# Patient Record
Sex: FEMALE | Race: WHITE | NOT HISPANIC OR LATINO | URBAN - METROPOLITAN AREA
[De-identification: names, ages, dates, MRNs, and addresses within clinical notes are randomized per-mention and may not be internally consistent; named-entity substitution may affect disease eponyms.]

---

## 2017-04-24 ENCOUNTER — INPATIENT (INPATIENT)
Facility: HOSPITAL | Age: 77
LOS: 3 days | Discharge: HOME | DRG: 242 | End: 2017-04-28
Attending: INTERNAL MEDICINE | Admitting: INTERNAL MEDICINE
Payer: MEDICARE

## 2017-04-24 VITALS
DIASTOLIC BLOOD PRESSURE: 70 MMHG | RESPIRATION RATE: 22 BRPM | OXYGEN SATURATION: 96 % | HEART RATE: 137 BPM | WEIGHT: 83.78 LBS | SYSTOLIC BLOOD PRESSURE: 155 MMHG | TEMPERATURE: 98 F

## 2017-04-24 DIAGNOSIS — I48.92 UNSPECIFIED ATRIAL FLUTTER: ICD-10-CM

## 2017-04-24 LAB
APPEARANCE UR: CLEAR — SIGNIFICANT CHANGE UP
APTT BLD: 34 SEC — SIGNIFICANT CHANGE UP (ref 27.5–37.4)
BASOPHILS # BLD AUTO: 0 K/UL — SIGNIFICANT CHANGE UP (ref 0–0.2)
BASOPHILS NFR BLD AUTO: 0.1 % — SIGNIFICANT CHANGE UP (ref 0–2)
BILIRUB UR-MCNC: NEGATIVE — SIGNIFICANT CHANGE UP
CK MB CFR SERPL CALC: 12.7 NG/ML — HIGH (ref 0–6.7)
CK SERPL-CCNC: 256 U/L — HIGH (ref 25–170)
COLOR SPEC: YELLOW — SIGNIFICANT CHANGE UP
DIFF PNL FLD: ABNORMAL
EPI CELLS # UR: SIGNIFICANT CHANGE UP
GLUCOSE UR QL: NEGATIVE MG/DL — SIGNIFICANT CHANGE UP
HCT VFR BLD CALC: 51 % — HIGH (ref 37–47)
HGB BLD-MCNC: 18.1 G/DL — HIGH (ref 12–16)
INR BLD: 1.07 RATIO — SIGNIFICANT CHANGE UP (ref 0.88–1.16)
KETONES UR-MCNC: ABNORMAL
LACTATE BLDV-MCNC: 3.4 MMOL/L — HIGH (ref 0.5–2)
LEUKOCYTE ESTERASE UR-ACNC: NEGATIVE — SIGNIFICANT CHANGE UP
LYMPHOCYTES # BLD AUTO: 1.2 K/UL — SIGNIFICANT CHANGE UP (ref 1–4.8)
LYMPHOCYTES # BLD AUTO: 6.5 % — LOW (ref 20–55)
MAGNESIUM SERPL-MCNC: 1.9 MG/DL — SIGNIFICANT CHANGE UP (ref 1.8–2.5)
MCHC RBC-ENTMCNC: 31 PG — SIGNIFICANT CHANGE UP (ref 27–31)
MCHC RBC-ENTMCNC: 35.5 G/DL — SIGNIFICANT CHANGE UP (ref 32–36)
MCV RBC AUTO: 87.3 FL — SIGNIFICANT CHANGE UP (ref 81–99)
MONOCYTES # BLD AUTO: 1.2 K/UL — HIGH (ref 0–0.8)
MONOCYTES NFR BLD AUTO: 6.6 % — SIGNIFICANT CHANGE UP (ref 3–10)
NEUTROPHILS # BLD AUTO: 16.1 K/UL — HIGH (ref 1.8–8)
NEUTROPHILS NFR BLD AUTO: 86.5 % — HIGH (ref 37–73)
NITRITE UR-MCNC: NEGATIVE — SIGNIFICANT CHANGE UP
PH UR: 7 — SIGNIFICANT CHANGE UP (ref 5–8)
PLATELET # BLD AUTO: 411 K/UL — HIGH (ref 150–400)
PROT UR-MCNC: 100 MG/DL
PROTHROM AB SERPL-ACNC: 11.8 SEC — SIGNIFICANT CHANGE UP (ref 9.8–12.7)
RBC # BLD: 5.84 M/UL — HIGH (ref 4.4–5.2)
RBC # FLD: 13.8 % — SIGNIFICANT CHANGE UP (ref 11–15.6)
RBC CASTS # UR COMP ASSIST: SIGNIFICANT CHANGE UP /HPF (ref 0–4)
SP GR SPEC: 1.01 — SIGNIFICANT CHANGE UP (ref 1.01–1.02)
TROPONIN T SERPL-MCNC: 0.05 NG/ML — SIGNIFICANT CHANGE UP (ref 0–0.06)
UROBILINOGEN FLD QL: NEGATIVE MG/DL — SIGNIFICANT CHANGE UP
WBC # BLD: 18.5 K/UL — HIGH (ref 4.8–10.8)
WBC # FLD AUTO: 18.5 K/UL — HIGH (ref 4.8–10.8)
WBC UR QL: NEGATIVE — SIGNIFICANT CHANGE UP

## 2017-04-24 PROCEDURE — 99223 1ST HOSP IP/OBS HIGH 75: CPT

## 2017-04-24 PROCEDURE — 70450 CT HEAD/BRAIN W/O DYE: CPT | Mod: 26

## 2017-04-24 PROCEDURE — 71010: CPT | Mod: 26

## 2017-04-24 PROCEDURE — 93010 ELECTROCARDIOGRAM REPORT: CPT

## 2017-04-24 PROCEDURE — 71250 CT THORAX DX C-: CPT | Mod: 26

## 2017-04-24 PROCEDURE — 99285 EMERGENCY DEPT VISIT HI MDM: CPT

## 2017-04-24 RX ORDER — AMITRIPTYLINE HCL 25 MG
25 TABLET ORAL AT BEDTIME
Qty: 0 | Refills: 0 | Status: DISCONTINUED | OUTPATIENT
Start: 2017-04-24 | End: 2017-04-28

## 2017-04-24 RX ORDER — CLOPIDOGREL BISULFATE 75 MG/1
1 TABLET, FILM COATED ORAL
Qty: 0 | Refills: 0 | COMMUNITY

## 2017-04-24 RX ORDER — TIOTROPIUM BROMIDE 18 UG/1
1 CAPSULE ORAL; RESPIRATORY (INHALATION) DAILY
Qty: 0 | Refills: 0 | Status: DISCONTINUED | OUTPATIENT
Start: 2017-04-24 | End: 2017-04-27

## 2017-04-24 RX ORDER — HEPARIN SODIUM 5000 [USP'U]/ML
INJECTION INTRAVENOUS; SUBCUTANEOUS
Qty: 25000 | Refills: 0 | Status: DISCONTINUED | OUTPATIENT
Start: 2017-04-24 | End: 2017-04-25

## 2017-04-24 RX ORDER — HEPARIN SODIUM 5000 [USP'U]/ML
3000 INJECTION INTRAVENOUS; SUBCUTANEOUS EVERY 6 HOURS
Qty: 0 | Refills: 0 | Status: DISCONTINUED | OUTPATIENT
Start: 2017-04-24 | End: 2017-04-25

## 2017-04-24 RX ORDER — AMITRIPTYLINE HCL 25 MG
1 TABLET ORAL
Qty: 0 | Refills: 0 | COMMUNITY

## 2017-04-24 RX ORDER — ONDANSETRON 8 MG/1
4 TABLET, FILM COATED ORAL ONCE
Qty: 0 | Refills: 0 | Status: COMPLETED | OUTPATIENT
Start: 2017-04-24 | End: 2017-04-24

## 2017-04-24 RX ORDER — ALBUTEROL 90 UG/1
2 AEROSOL, METERED ORAL EVERY 6 HOURS
Qty: 0 | Refills: 0 | Status: DISCONTINUED | OUTPATIENT
Start: 2017-04-24 | End: 2017-04-28

## 2017-04-24 RX ORDER — CLOPIDOGREL BISULFATE 75 MG/1
75 TABLET, FILM COATED ORAL DAILY
Qty: 0 | Refills: 0 | Status: DISCONTINUED | OUTPATIENT
Start: 2017-04-24 | End: 2017-04-28

## 2017-04-24 RX ORDER — PANTOPRAZOLE SODIUM 20 MG/1
40 TABLET, DELAYED RELEASE ORAL
Qty: 0 | Refills: 0 | Status: DISCONTINUED | OUTPATIENT
Start: 2017-04-24 | End: 2017-04-28

## 2017-04-24 RX ORDER — OXYCODONE HYDROCHLORIDE 5 MG/1
1 TABLET ORAL
Qty: 0 | Refills: 0 | COMMUNITY

## 2017-04-24 RX ORDER — DILTIAZEM HCL 120 MG
10 CAPSULE, EXT RELEASE 24 HR ORAL
Qty: 125 | Refills: 0 | Status: DISCONTINUED | OUTPATIENT
Start: 2017-04-24 | End: 2017-04-25

## 2017-04-24 RX ORDER — OXYCODONE HYDROCHLORIDE 5 MG/1
10 TABLET ORAL EVERY 12 HOURS
Qty: 0 | Refills: 0 | Status: DISCONTINUED | OUTPATIENT
Start: 2017-04-24 | End: 2017-04-28

## 2017-04-24 RX ORDER — HEPARIN SODIUM 5000 [USP'U]/ML
1500 INJECTION INTRAVENOUS; SUBCUTANEOUS EVERY 6 HOURS
Qty: 0 | Refills: 0 | Status: DISCONTINUED | OUTPATIENT
Start: 2017-04-24 | End: 2017-04-25

## 2017-04-24 RX ORDER — ATORVASTATIN CALCIUM 80 MG/1
40 TABLET, FILM COATED ORAL AT BEDTIME
Qty: 0 | Refills: 0 | Status: DISCONTINUED | OUTPATIENT
Start: 2017-04-24 | End: 2017-04-28

## 2017-04-24 RX ORDER — HEPARIN SODIUM 5000 [USP'U]/ML
3000 INJECTION INTRAVENOUS; SUBCUTANEOUS ONCE
Qty: 0 | Refills: 0 | Status: COMPLETED | OUTPATIENT
Start: 2017-04-24 | End: 2017-04-24

## 2017-04-24 RX ORDER — SODIUM CHLORIDE 9 MG/ML
2000 INJECTION INTRAMUSCULAR; INTRAVENOUS; SUBCUTANEOUS ONCE
Qty: 0 | Refills: 0 | Status: COMPLETED | OUTPATIENT
Start: 2017-04-24 | End: 2017-04-24

## 2017-04-24 RX ORDER — ALPRAZOLAM 0.25 MG
0.25 TABLET ORAL DAILY
Qty: 0 | Refills: 0 | Status: DISCONTINUED | OUTPATIENT
Start: 2017-04-24 | End: 2017-04-25

## 2017-04-24 RX ORDER — ALPRAZOLAM 0.25 MG
0.5 TABLET ORAL DAILY
Qty: 0 | Refills: 0 | Status: DISCONTINUED | OUTPATIENT
Start: 2017-04-24 | End: 2017-04-24

## 2017-04-24 RX ORDER — SODIUM CHLORIDE 9 MG/ML
3 INJECTION INTRAMUSCULAR; INTRAVENOUS; SUBCUTANEOUS ONCE
Qty: 0 | Refills: 0 | Status: COMPLETED | OUTPATIENT
Start: 2017-04-24 | End: 2017-04-24

## 2017-04-24 RX ADMIN — Medication 10 MG/HR: at 17:04

## 2017-04-24 RX ADMIN — Medication 0.25 MILLIGRAM(S): at 23:38

## 2017-04-24 RX ADMIN — HEPARIN SODIUM 700 UNIT(S)/HR: 5000 INJECTION INTRAVENOUS; SUBCUTANEOUS at 18:48

## 2017-04-24 RX ADMIN — ONDANSETRON 4 MILLIGRAM(S): 8 TABLET, FILM COATED ORAL at 16:02

## 2017-04-24 RX ADMIN — HEPARIN SODIUM 3000 UNIT(S): 5000 INJECTION INTRAVENOUS; SUBCUTANEOUS at 18:51

## 2017-04-24 RX ADMIN — SODIUM CHLORIDE 3 MILLILITER(S): 9 INJECTION INTRAMUSCULAR; INTRAVENOUS; SUBCUTANEOUS at 14:19

## 2017-04-24 RX ADMIN — ATORVASTATIN CALCIUM 40 MILLIGRAM(S): 80 TABLET, FILM COATED ORAL at 23:05

## 2017-04-24 RX ADMIN — SODIUM CHLORIDE 2000 MILLILITER(S): 9 INJECTION INTRAMUSCULAR; INTRAVENOUS; SUBCUTANEOUS at 14:19

## 2017-04-24 NOTE — ED PROVIDER NOTE - MEDICAL DECISION MAKING DETAILS
Additional Safety/Bands:
pt with n/v/cp/sob and tachy.  will check labs, CXR, CT, IVF and cards consult.

## 2017-04-24 NOTE — ED ADULT NURSE REASSESSMENT NOTE - NS ED NURSE REASSESS COMMENT FT1
Pt received @1630, A&OX3, denies any pain.  HR at 135.  Pt appears to be sob.  CM in place and maintained.  Dr. Leal at bedside.  Will be begin Cardizem drip soon.  Clear bsb, abd soft nondistended, nontender, moving all ext well.  Will continue to monitor.  at bedside.

## 2017-04-24 NOTE — CONSULT NOTE ADULT - SUBJECTIVE AND OBJECTIVE BOX
Chief Complaint: weakness fatigue rapid pulse.    HPI: Old records reviewed. &^ yo F with 2 day hx of above. Pt stopped her cardizem  and lopressor on Dr. San's order-an op holter showed up to 5 seconds pauses. PMH is signif for O2 dependent copd (quit smoking in 2012) non obstructive cad/right carotid stenting with old cva/left leg stenting and pvd/chronic back pain/fall risk/moderate MR and AI/ap/single kidney/hpt/left subclavuian stenosis. Denies allergy. No etoh. Denies mi/dm/siezure/hepatic or thyroid hx. Very inactive at home. On chronic opoid therapy for pain.    PAST MEDICAL & SURGICAL HISTORY:  Dyslipidemia  RBBB  Respiratory failure  PVD (peripheral vascular disease)  HTN (hypertension)  Bradycardia  Renal artery anomaly  Vascular disease  CAD (coronary artery disease)  Atrial septal aneurysm  Cardiomyopathy  Hyperlipemia  Hypertension  CVA (cerebral infarction)  COPD (chronic obstructive pulmonary disease)  Atrial fibrillation  Cardiac arrest  Pulmonary edema  S/P appendectomy  S/P D&amp;C (status post dilation and curettage)      PREVIOUS DIAGNOSTIC TESTING:      ECHO  FINDINGS: EF 70% biatrial enlargement/mod MR and AI.    STRESS  FINDINGS:    CATHETERIZATION  FINDINGS: nonobstructive cad.    MEDICATIONS  (STANDING):  ondansetron Injectable 4milliGRAM(s) IV Push once    MEDICATIONS  (PRN):      FAMILY HISTORY:  No pertinent family history in first degree relatives      SOCIAL HISTORY: lives w     CIGARETTES: see above    ALCOHOL: 0    ROS: Negative other than as mentioned in HPI.    Vital Signs Last 24 Hrs  T(C): 36.7, Max: 36.7 (04-24 @ 13:41)  T(F): 98, Max: 98 (04-24 @ 13:41)  HR: 137 (137 - 137)  BP: 140/80 ra and 120/80 la manually.  BP(mean): --  RR: 18 (22 - 22)  SpO2: 96% (96% - 96%)    PHYSICAL EXAM:  General: Elderly frail F nad.  HEENT: Head; normocephalic, atraumatic.  Eyes;   Pupils reactive, cornea wnl.  Neck; Supple, no nodes adenopathy, no NVD. Soft left carotid bruitr. No thyromegaly.  CARDIOVASCULAR: Irreg tach approx 150-monitor shoiws aflutter.  LUNGS; No rales, rhonchi or wheeze. Normal breath sounds bilaterally.  ABDOMEN ; Soft, nontender without mass or organomegaly. bowel sounds normoactive.  EXTREMITIES; No clubbing, cyanosis or edema. absent pedal pulses.  SKIN; warm and dry with normal turgor.  NEURO; Alert/oriented x 3/normal motor exam. No pathologic reflexes.    PSYCH; normal affect.            INTERPRETATION OF TELEMETRY:    ECG: rapid atrial flutter/rbbbb  CXR: increased markings bilat/ca aortic knob/left hilar mass not noted on last yrs cxr.  I&O's Detail      LABS:                        18.1   18.5  )-----------( 411      ( 24 Apr 2017 14:22 )             51.0     04-24    137  |  96<L>  |  30.0<H>  ----------------------------<  164<H>  4.0   |  17.0<L>  |  1.13    Ca    7.6<L>      24 Apr 2017 14:22    TPro  8.6  /  Alb  4.7  /  TBili  0.7  /  DBili  x   /  AST  33<H>  /  ALT  13  /  AlkPhos  69  04-24    CARDIAC MARKERS ( 24 Apr 2017 14:22 )  x     / 0.05 ng/mL / 256 U/L / x     / 12.7 ng/mL  blood gas lactate 3.4    PT/INR - ( 24 Apr 2017 14:22 )   PT: 11.8 sec;   INR: 1.07 ratio         PTT - ( 24 Apr 2017 14:22 )  PTT:34.0 sec    I&O's Summary      RADIOLOGY & ADDITIONAL STUDIES: Chief Complaint: weakness fatigue rapid pulse.    HPI: Old records reviewed. &^ yo F with 2 day hx of above. Pt stopped her cardizem  and lopressor on Dr. San's order-an op holter showed up to 5 seconds pauses. PMH is signif for O2 dependent copd (quit smoking in 2012) non obstructive cad/right carotid stenting with old cva/left leg stenting and pvd/chronic back pain/fall risk/moderate MR and AI/ap/single kidney/hpt/left subclavuian stenosis. Denies allergy. No etoh. Denies mi/dm/siezure/hepatic or thyroid hx. Very inactive at home. On chronic opoid therapy for pain. OP meds listed are cardizem 180 and lopressor 12.5 bid both held yesterday/albuterol/xanax/lipitor 40/kcl 20/protonix/prolia/tudorza inhaler/fe.    PAST MEDICAL & SURGICAL HISTORY:  Dyslipidemia  RBBB  Respiratory failure  PVD (peripheral vascular disease)  HTN (hypertension)  Bradycardia  Renal artery anomaly  Vascular disease  CAD (coronary artery disease)  Atrial septal aneurysm  Cardiomyopathy  Hyperlipemia  Hypertension  CVA (cerebral infarction)  COPD (chronic obstructive pulmonary disease)  Atrial fibrillation  Cardiac arrest  Pulmonary edema  S/P appendectomy  S/P D&amp;C (status post dilation and curettage)      PREVIOUS DIAGNOSTIC TESTING:      ECHO  FINDINGS: EF 70% biatrial enlargement/mod MR and AI.    STRESS  FINDINGS:    CATHETERIZATION  FINDINGS: nonobstructive cad.    MEDICATIONS  (STANDING):  ondansetron Injectable 4milliGRAM(s) IV Push once    MEDICATIONS  (PRN):      FAMILY HISTORY:  No pertinent family history in first degree relatives      SOCIAL HISTORY: lives w     CIGARETTES: see above    ALCOHOL: 0    ROS: Negative other than as mentioned in HPI.    Vital Signs Last 24 Hrs  T(C): 36.7, Max: 36.7 (04-24 @ 13:41)  T(F): 98, Max: 98 (04-24 @ 13:41)  HR: 137 (137 - 137)  BP: 140/80 ra and 120/80 la manually.  BP(mean): --  RR: 18 (22 - 22)  SpO2: 96% (96% - 96%)    PHYSICAL EXAM:  General: Elderly frail F nad.  HEENT: Head; normocephalic, atraumatic.  Eyes;   Pupils reactive, cornea wnl.  Neck; Supple, no nodes adenopathy, no NVD. Soft left carotid bruitr. No thyromegaly.  CARDIOVASCULAR: Irreg tach approx 150-monitor shoiws aflutter.  LUNGS; No rales, rhonchi or wheeze. Normal breath sounds bilaterally.  ABDOMEN ; Soft, nontender without mass or organomegaly. bowel sounds normoactive.  EXTREMITIES; No clubbing, cyanosis or edema. absent pedal pulses.  SKIN; warm and dry with normal turgor.  NEURO; Alert/oriented x 3/normal motor exam. No pathologic reflexes.    PSYCH; normal affect.            INTERPRETATION OF TELEMETRY:    ECG: rapid atrial flutter/rbbbb  CXR: increased markings bilat/ca aortic knob/left hilar mass not noted on last yrs cxr.  I&O's Detail      LABS:                        18.1   18.5  )-----------( 411      ( 24 Apr 2017 14:22 )             51.0     04-24    137  |  96<L>  |  30.0<H>  ----------------------------<  164<H>  4.0   |  17.0<L>  |  1.13    Ca    7.6<L>      24 Apr 2017 14:22    TPro  8.6  /  Alb  4.7  /  TBili  0.7  /  DBili  x   /  AST  33<H>  /  ALT  13  /  AlkPhos  69  04-24    CARDIAC MARKERS ( 24 Apr 2017 14:22 )  x     / 0.05 ng/mL / 256 U/L / x     / 12.7 ng/mL  blood gas lactate 3.4    PT/INR - ( 24 Apr 2017 14:22 )   PT: 11.8 sec;   INR: 1.07 ratio         PTT - ( 24 Apr 2017 14:22 )  PTT:34.0 sec    I&O's Summary      RADIOLOGY & ADDITIONAL STUDIES:

## 2017-04-24 NOTE — ED PROVIDER NOTE - OBJECTIVE STATEMENT
77 y/o female in ED c/o weakness, cp, n/v x 1 wk s/p stopping BP meds.  pt states seen by Dr San (cards) last week and had normal echo and was found with bradycardia on Holter.  pt states her cardiazem and lopressor was stopped by her doctor and since then not feeling well.  pt denies any fever, HA, diarrhea.  tolerating some fluids.  no sick contacts or recent travel.

## 2017-04-24 NOTE — ED ADULT NURSE NOTE - OBJECTIVE STATEMENT
PT states she wasn't feeling well and saw cardiology Mena and wore a halter for 24hrs, pt states Dr San's office called her and told her to stop taking Metopolol and to go to ED. Pt states she hasn't been feeling well she has been nauseous and vomiting. Pt is A & ox3, respirations are even & unlabored. Pt's HR is fluctuating between 130 - 142BPM.  MD Lo aware and at bedside.

## 2017-04-24 NOTE — CONSULT NOTE ADULT - SUBJECTIVE AND OBJECTIVE BOX
[facility]                                   [address] [place] [address]                                                      PHONE: [phone]                                                         FAX: [phone]  [email or web address]  ---------------------------------------------------------------------------------------------------------------------------------    Reason for Consult:    HPI:  TYLER LANGSTON is an 76y Female with history PVD, carotid and LE stents, old CVA, O2 dependent COPD, history of documented sick sinus syndrome and sinus pauses to 5 secondswhich led to discontinuation of Cardizem and lopressor several days ago (sees Dr. San in our office), moderate MR and AR by echo, single kidnet, left subclavian stenoisis, admitted with several days fatigue, malaise, rapid heart beating, lightheadedness.  Noted atrial flutter with predominant 2:1 AV block, tachycardia here.    PAST MEDICAL & SURGICAL HISTORY:  Dyslipidemia  RBBB  Respiratory failure  PVD (peripheral vascular disease)  HTN (hypertension)  Bradycardia  Renal artery anomaly  Vascular disease  CAD (coronary artery disease)  Atrial septal aneurysm  Cardiomyopathy  Hyperlipemia  Hypertension  CVA (cerebral infarction)  COPD (chronic obstructive pulmonary disease)  Atrial fibrillation  Cardiac arrest  Pulmonary edema  S/P appendectomy  S/P D&amp;C (status post dilation and curettage)      fiberglass (Rash)  No Known Drug Allergies      MEDICATIONS  (STANDING):  diltiazem Infusion 10mG/Hr IV Continuous <Continuous>    MEDICATIONS  (PRN):      Social History:  Cigarettes:                    Alchohol:                 Illicit Drug Abuse:      ROS: Negative other than as mentioned in HPI.    Vital Signs Last 24 Hrs  T(C): 37.7, Max: 37.7 (04-24 @ 15:30)  T(F): 99.9, Max: 99.9 (04-24 @ 15:30)  HR: 128 (128 - 137)  BP: 150/83 (150/83 - 155/70)  BP(mean): --  RR: 19 (19 - 22)  SpO2: 98% (96% - 98%)  ICU Vital Signs Last 24 Hrs  TYLERDIAMOND MILLERIVIS  I&O's Detail    I&O's Summary    Drug Dosing Weight  TYLER MILLERETON      PHYSICAL EXAM:  General: Appears well developed, well nourished alert and cooperative.  HEENT: Head; normocephalic, atraumatic.  Eyes: Pupils reactive, cornea wnl.  Neck: Supple, no nodes adenopathy, no NVD or carotid bruit or thyromegaly.  CARDIOVASCULAR: tachy, regular rhythm,  S1 and S2, 2/6 HSM apex.   LUNGS: No rales, rhonchi or wheeze. Normal breath sounds bilaterally.  ABDOMEN: Soft, nontender without mass or organomegaly. bowel sounds normoactive.  EXTREMITIES: No clubbing, cyanosis or edema. Distal pulses wnl.   SKIN: warm and dry with normal turgor.  NEURO: Alert/oriented x 3/normal motor exam. No pathologic reflexes.    PSYCH: normal affect.        LABS:                        18.1   18.5  )-----------( 411      ( 24 Apr 2017 14:22 )             51.0     04-24    137  |  96<L>  |  30.0<H>  ----------------------------<  164<H>  4.0   |  17.0<L>  |  1.13    Ca    7.6<L>      24 Apr 2017 14:22    TPro  8.6  /  Alb  4.7  /  TBili  0.7  /  DBili  x   /  AST  33<H>  /  ALT  13  /  AlkPhos  69  04-24    TYLER LANGSTON  CARDIAC MARKERS ( 24 Apr 2017 14:22 )  x     / 0.05 ng/mL / 256 U/L / x     / 12.7 ng/mL      PT/INR - ( 24 Apr 2017 14:22 )   PT: 11.8 sec;   INR: 1.07 ratio         PTT - ( 24 Apr 2017 14:22 )  PTT:34.0 sec      RADIOLOGY & ADDITIONAL STUDIES:    INTERPRETATION OF TELEMETRY (personally reviewed): atrial flutter, tachy    ECG: a flutter 2:1 av block    ECHO:4/2017- hyperdynamic LV, mod MR, AR, RVSP ~30        Assessment and Plan:  In summary, TYLER LANGSTON is an 76y Female with past medical history significant for SSS, HTN, COPD, admitted with symptomatic rapidly conducted atrial flutter.  Will need pacemaker to allow for safe reintroduction of AV jacinto blocking agents given recent alexander.  Discussed with patient, will plan for pacer tomorrow.  Keep NPO after midnight. Low dose IV cardizem for  IV heparin for CVA prophylaxis for now, transition to oral anticoagulation after pacer.

## 2017-04-24 NOTE — ED PROVIDER NOTE - PMH
Atrial fibrillation    Atrial septal aneurysm    Bradycardia    CAD (coronary artery disease)    Cardiac arrest    Cardiomyopathy    COPD (chronic obstructive pulmonary disease)    CVA (cerebral infarction)    Dyslipidemia    HTN (hypertension)    Hyperlipemia    Hypertension    Pulmonary edema    PVD (peripheral vascular disease)    RBBB    Renal artery anomaly    Respiratory failure    Vascular disease

## 2017-04-24 NOTE — H&P ADULT - HISTORY OF PRESENT ILLNESS
76 y F hx CAD, COPD on home O2 qhs, CVA, chronic back pain, afib, PAD s/p stents, presented to ER c/o 2 d hx n/v, palpitations, chest discomfort. Diltiazem and metoprolol stopped 2 d ago by Dr Miranda for sinus pauses noted on recent holter monitoring. Denies F/C, cough, SOB, diarrhea, abd pain, h/a, LE edema. noted to be in aflutter in ED w HR 120s-180s, evaluated by cardiology and started on cardizem drip. Feeling a bit better now.

## 2017-04-24 NOTE — H&P ADULT - ASSESSMENT
76 y F hx CAD, COPD, CVA, PAD, chronic back pain, anxiety, HTN, mod-severe MR, adm for n/v, palpitations, SSS.     Tachy/alexander syndrome: appreciate cardiology/EP input, for PPM tomorrow. IVF given in ED. started on cardizem drip for rate control. IV heparin for AC. monitor lytes.   CAD: continue home meds as tolerated  Chronic back pain: cont home pain meds  HTN: on cardizem, resume home meds as tolerated    prophyl: anticoagulated

## 2017-04-24 NOTE — H&P ADULT - NSHPLABSRESULTS_GEN_ALL_CORE
CXR: Impression:  Cardiomegaly. Clear lungs..    EKG: aflutter, IRBBB, nonspecific ST/T wave abnormalities

## 2017-04-24 NOTE — CONSULT NOTE ADULT - ASSESSMENT
1. SSS/tachybrady syndrome with current rapid atrial flutter with a VR in 150's Off meds. Very recent holter showed 5 sec pauses. Pt will need a permanent pacer and EP has been called. In the interim will lower VR with IV cardizem.  2. copd-O2 dependent-former smker.  3. hx of hpertension w left subclavian stenosis  4. carotid stenting on left with old cva.  5. pvd-prior stenting.  6. portable cxr suggests left hilar mass-would get ct for further delineation  7. chronic back pain  8. secondary polycythemia.

## 2017-04-24 NOTE — H&P ADULT - NSHPPHYSICALEXAM_GEN_ALL_CORE
Vital Signs Last 24 Hrs  T(C): 37.7, Max: 37.7 (04-24 @ 15:30)  T(F): 99.9, Max: 99.9 (04-24 @ 15:30)  HR: 128 (128 - 137)  BP: 150/83 (150/83 - 155/70)  BP(mean): --  RR: 19 (19 - 22)  SpO2: 98% (96% - 98%)    PHYSICAL EXAM-  GENERAL: alert, NAD  HEAD:  Atraumatic, Normocephalic  EYES: EOMI,  conjunctiva and sclera clear  NECK: Supple   CHEST/LUNG: CTA bilaterally   HEART: regular, tachycardic,  No murmurs   ABDOMEN: Soft, Nontender, Nondistended; Bowel sounds present  EXTREMITIES:  2+ Peripheral Pulses, No clubbing, cyanosis, or edema  NERVOUS SYSTEM:  Alert & Oriented X3, Motor Strength 5/5 B/L upper and lower extremities; CN grossly intact  SKIN: No rashes or lesions  PSYCH: normal affect

## 2017-04-24 NOTE — ED PROVIDER NOTE - PROGRESS NOTE DETAILS
pt evaluated by Dr Leal and states pt in intermittent AFl.  recommend cardiazem drip and admit for PM. renata d/w Jose and will admit

## 2017-04-25 DIAGNOSIS — I49.5 SICK SINUS SYNDROME: ICD-10-CM

## 2017-04-25 DIAGNOSIS — I25.10 ATHEROSCLEROTIC HEART DISEASE OF NATIVE CORONARY ARTERY WITHOUT ANGINA PECTORIS: ICD-10-CM

## 2017-04-25 DIAGNOSIS — J44.9 CHRONIC OBSTRUCTIVE PULMONARY DISEASE, UNSPECIFIED: ICD-10-CM

## 2017-04-25 DIAGNOSIS — E78.5 HYPERLIPIDEMIA, UNSPECIFIED: ICD-10-CM

## 2017-04-25 DIAGNOSIS — I10 ESSENTIAL (PRIMARY) HYPERTENSION: ICD-10-CM

## 2017-04-25 LAB
ANION GAP SERPL CALC-SCNC: 19 MMOL/L — HIGH (ref 5–17)
APTT BLD: 65.7 SEC — HIGH (ref 27.5–37.4)
APTT BLD: 99.3 SEC — HIGH (ref 27.5–37.4)
BUN SERPL-MCNC: 16 MG/DL — SIGNIFICANT CHANGE UP (ref 8–20)
CALCIUM SERPL-MCNC: 8.2 MG/DL — LOW (ref 8.6–10.2)
CHLORIDE SERPL-SCNC: 99 MMOL/L — SIGNIFICANT CHANGE UP (ref 98–107)
CO2 SERPL-SCNC: 17 MMOL/L — LOW (ref 22–29)
CREAT SERPL-MCNC: 0.58 MG/DL — SIGNIFICANT CHANGE UP (ref 0.5–1.3)
GLUCOSE SERPL-MCNC: 124 MG/DL — HIGH (ref 70–115)
HCT VFR BLD CALC: 42.5 % — SIGNIFICANT CHANGE UP (ref 37–47)
HCT VFR BLD CALC: 43 % — SIGNIFICANT CHANGE UP (ref 37–47)
HGB BLD-MCNC: 14.6 G/DL — SIGNIFICANT CHANGE UP (ref 12–16)
HGB BLD-MCNC: 15 G/DL — SIGNIFICANT CHANGE UP (ref 12–16)
MCHC RBC-ENTMCNC: 30.3 PG — SIGNIFICANT CHANGE UP (ref 27–31)
MCHC RBC-ENTMCNC: 30.4 PG — SIGNIFICANT CHANGE UP (ref 27–31)
MCHC RBC-ENTMCNC: 34.4 G/DL — SIGNIFICANT CHANGE UP (ref 32–36)
MCHC RBC-ENTMCNC: 34.9 G/DL — SIGNIFICANT CHANGE UP (ref 32–36)
MCV RBC AUTO: 86.9 FL — SIGNIFICANT CHANGE UP (ref 81–99)
MCV RBC AUTO: 88.5 FL — SIGNIFICANT CHANGE UP (ref 81–99)
PLATELET # BLD AUTO: 346 K/UL — SIGNIFICANT CHANGE UP (ref 150–400)
PLATELET # BLD AUTO: 351 K/UL — SIGNIFICANT CHANGE UP (ref 150–400)
POTASSIUM SERPL-MCNC: 3.2 MMOL/L — LOW (ref 3.5–5.3)
POTASSIUM SERPL-SCNC: 3.2 MMOL/L — LOW (ref 3.5–5.3)
RBC # BLD: 4.8 M/UL — SIGNIFICANT CHANGE UP (ref 4.4–5.2)
RBC # BLD: 4.95 M/UL — SIGNIFICANT CHANGE UP (ref 4.4–5.2)
RBC # FLD: 13.9 % — SIGNIFICANT CHANGE UP (ref 11–15.6)
RBC # FLD: 14.1 % — SIGNIFICANT CHANGE UP (ref 11–15.6)
SODIUM SERPL-SCNC: 135 MMOL/L — SIGNIFICANT CHANGE UP (ref 135–145)
TROPONIN T SERPL-MCNC: 0.04 NG/ML — SIGNIFICANT CHANGE UP (ref 0–0.06)
WBC # BLD: 13.4 K/UL — HIGH (ref 4.8–10.8)
WBC # BLD: 15.9 K/UL — HIGH (ref 4.8–10.8)
WBC # FLD AUTO: 13.4 K/UL — HIGH (ref 4.8–10.8)
WBC # FLD AUTO: 15.9 K/UL — HIGH (ref 4.8–10.8)

## 2017-04-25 PROCEDURE — 93010 ELECTROCARDIOGRAM REPORT: CPT

## 2017-04-25 PROCEDURE — 99233 SBSQ HOSP IP/OBS HIGH 50: CPT

## 2017-04-25 RX ORDER — ONDANSETRON 8 MG/1
4 TABLET, FILM COATED ORAL EVERY 8 HOURS
Qty: 0 | Refills: 0 | Status: DISCONTINUED | OUTPATIENT
Start: 2017-04-25 | End: 2017-04-28

## 2017-04-25 RX ORDER — CEFAZOLIN SODIUM 1 G
2000 VIAL (EA) INJECTION EVERY 8 HOURS
Qty: 0 | Refills: 0 | Status: COMPLETED | OUTPATIENT
Start: 2017-04-26 | End: 2017-04-26

## 2017-04-25 RX ORDER — CEPHALEXIN 500 MG
500 CAPSULE ORAL
Qty: 0 | Refills: 0 | Status: DISCONTINUED | OUTPATIENT
Start: 2017-04-26 | End: 2017-04-28

## 2017-04-25 RX ORDER — ACETAMINOPHEN 500 MG
650 TABLET ORAL EVERY 6 HOURS
Qty: 0 | Refills: 0 | Status: DISCONTINUED | OUTPATIENT
Start: 2017-04-25 | End: 2017-04-28

## 2017-04-25 RX ORDER — DILTIAZEM HCL 120 MG
120 CAPSULE, EXT RELEASE 24 HR ORAL DAILY
Qty: 0 | Refills: 0 | Status: DISCONTINUED | OUTPATIENT
Start: 2017-04-25 | End: 2017-04-26

## 2017-04-25 RX ORDER — ALPRAZOLAM 0.25 MG
0.25 TABLET ORAL THREE TIMES A DAY
Qty: 0 | Refills: 0 | Status: DISCONTINUED | OUTPATIENT
Start: 2017-04-25 | End: 2017-04-28

## 2017-04-25 RX ORDER — POTASSIUM CHLORIDE 20 MEQ
10 PACKET (EA) ORAL
Qty: 0 | Refills: 0 | Status: COMPLETED | OUTPATIENT
Start: 2017-04-25 | End: 2017-04-25

## 2017-04-25 RX ADMIN — CLOPIDOGREL BISULFATE 75 MILLIGRAM(S): 75 TABLET, FILM COATED ORAL at 12:15

## 2017-04-25 RX ADMIN — Medication 25 MILLIGRAM(S): at 21:29

## 2017-04-25 RX ADMIN — Medication 10 MG/HR: at 05:33

## 2017-04-25 RX ADMIN — Medication 0.25 MILLIGRAM(S): at 21:29

## 2017-04-25 RX ADMIN — ATORVASTATIN CALCIUM 40 MILLIGRAM(S): 80 TABLET, FILM COATED ORAL at 23:00

## 2017-04-25 RX ADMIN — Medication 0.25 MILLIGRAM(S): at 12:15

## 2017-04-25 RX ADMIN — HEPARIN SODIUM 700 UNIT(S)/HR: 5000 INJECTION INTRAVENOUS; SUBCUTANEOUS at 08:43

## 2017-04-25 RX ADMIN — ONDANSETRON 4 MILLIGRAM(S): 8 TABLET, FILM COATED ORAL at 22:06

## 2017-04-25 RX ADMIN — Medication 100 MILLIEQUIVALENT(S): at 21:29

## 2017-04-25 RX ADMIN — OXYCODONE HYDROCHLORIDE 10 MILLIGRAM(S): 5 TABLET ORAL at 06:02

## 2017-04-25 RX ADMIN — HEPARIN SODIUM 700 UNIT(S)/HR: 5000 INJECTION INTRAVENOUS; SUBCUTANEOUS at 02:00

## 2017-04-25 RX ADMIN — PANTOPRAZOLE SODIUM 40 MILLIGRAM(S): 20 TABLET, DELAYED RELEASE ORAL at 06:02

## 2017-04-25 RX ADMIN — Medication 120 MILLIGRAM(S): at 21:29

## 2017-04-25 RX ADMIN — OXYCODONE HYDROCHLORIDE 10 MILLIGRAM(S): 5 TABLET ORAL at 06:44

## 2017-04-25 RX ADMIN — Medication 100 MILLIEQUIVALENT(S): at 17:56

## 2017-04-25 RX ADMIN — Medication 100 MILLIEQUIVALENT(S): at 23:05

## 2017-04-25 NOTE — DIETITIAN INITIAL EVALUATION ADULT. - OTHER INFO
Pt admitted with YOLY RomanO for PPM this date.  reports pt has hx of poor appetite pta and weight fluctuates between # for several years.  reports many tests have been done over the years, only probably cause of decreased appetite is continuous pain medication use for chronic back pain.  Pt drinks Glucerna at home (due to preference), only when she skips a meal. Education provided on high calorie/protein diet.

## 2017-04-25 NOTE — PROGRESS NOTE ADULT - ASSESSMENT
76 y F hx CAD, COPD, CVA, PAD, chronic back pain, anxiety, HTN, mod-severe MR here with atrial flutter. Awaiting

## 2017-04-25 NOTE — CHART NOTE - NSCHARTNOTEFT_GEN_A_CORE
Upon Nutritional Assessment by the Registered Dietitian your patient was determined to meet criteria / has evidence of the following diagnosis/diagnoses:          [ ]  Mild Protein Calorie Malnutrition        [ ]  Moderate Protein Calorie Malnutrition        [ ] Severe Protein Calorie Malnutrition        [ ] Unspecified Protein Calorie Malnutrition        [ ] Underweight / BMI <19        [ ] Morbid Obesity / BMI > 40      Findings as based on:  •  Comprehensive nutrition assessment and consultation  •  Calorie counts (nutrient intake analysis)  •  Food acceptance and intake status from observations by staff  •  Follow up  •  Patient education  •  Intervention secondary to interdisciplinary rounds  •   concerns      Treatment:    The following diet has been recommended:  -- restart diet as medically feasible: Regular with Ensure 1 can BID       PROVIDER Section:     By signing this assessment you are acknowledging and agree with the diagnosis/diagnoses assigned by the Registered Dietitian    Comments:

## 2017-04-25 NOTE — DISCHARGE NOTE ADULT - PLAN OF CARE
NSR s/p pacemaker implant Post Operative Pacemaker Device Instructions  -Please schedule a 7-10 day follow up with [facility]  - Bruising around the implant site or over the chest, side or arm near the incision is normal, and will take a few weeks to resolve.  -Do not lift the affected arm higher than 90 degrees (shoulder height) in any direction for 6 weeks.   - Do not push, pull or lift anything heavier than 10 lbs (about a gallon of milk) with the affected arm for 4 weeks.     -keep site dry for one week and dressing on until follow up with [facility]  - Do not touch the incision until it is completely healed.   - There are Steristrips (white strips of tape) on your incision, which will start to peel off on their own over the next 2-3 weeks. Do not pick at or peel off the Steristrips.   - Do not apply soaps, creams, lotions, ointments or powders to the incision until it is completely healed.  You should call the doctor if:   - you notice redness, drainage, swelling, increased tenderness, hot sensation around the  incision, bleeding or incision edges pulling apart.  - your temperature is greater than 100 degress F for more than 24 hours.  - you notice swelling or bulging at the incision or around the device that was not there when you left the hospital or is increasing in size.  -you experience increased difficulty breathing.  - you notice new/worsening swelling in your legs and ankles.  - you faint or have dizzy spells.  -you have any questions or concerns regarding your device or the procedure.

## 2017-04-25 NOTE — DISCHARGE NOTE ADULT - PATIENT PORTAL LINK FT
“You can access the [employer] Patient Portal, offered by [facility], by registering with the following website: [email or web address]”

## 2017-04-25 NOTE — DISCHARGE NOTE ADULT - CARE PLAN
Principal Discharge DX:	Tachy-alexander syndrome  Goal:	NSR s/p pacemaker implant  Instructions for follow-up, activity and diet:	Post Operative Pacemaker Device Instructions  -Please schedule a 7-10 day follow up with [facility] Device clinic  - Bruising around the implant site or over the chest, side or arm near the incision is normal, and will take a few weeks to resolve.  -Do not lift the affected arm higher than 90 degrees (shoulder height) in any direction for 6 weeks.   - Do not push, pull or lift anything heavier than 10 lbs (about a gallon of milk) with the affected arm for 4 weeks.     -keep site dry for one week and dressing on until follow up with [facility]  - Do not touch the incision until it is completely healed.   - There are Steristrips (white strips of tape) on your incision, which will start to peel off on their own over the next 2-3 weeks. Do not pick at or peel off the Steristrips.   - Do not apply soaps, creams, lotions, ointments or powders to the incision until it is completely healed.  You should call the doctor if:   - you notice redness, drainage, swelling, increased tenderness, hot sensation around the  incision, bleeding or incision edges pulling apart.  - your temperature is greater than 100 degress F for more than 24 hours.  - you notice swelling or bulging at the incision or around the device that was not there when you left the hospital or is increasing in size.  -you experience increased difficulty breathing.  - you notice new/worsening swelling in your legs and ankles.  - you faint or have dizzy spells.  -you have any questions or concerns regarding your device or the procedure. Principal Discharge DX:	Tachy-alexander syndrome  Goal:	NSR s/p pacemaker implant  Instructions for follow-up, activity and diet:	Post Operative Pacemaker Device Instructions  -Please schedule a 7-10 day follow up with [facility] Device clinic  - Bruising around the implant site or over the chest, side or arm near the incision is normal, and will take a few weeks to resolve.  -Do not lift the affected arm higher than 90 degrees (shoulder height) in any direction for 6 weeks.   - Do not push, pull or lift anything heavier than 10 lbs (about a gallon of milk) with the affected arm for 4 weeks.     -keep site dry for one week and dressing on until follow up with [facility]  - Do not touch the incision until it is completely healed.   - There are Steristrips (white strips of tape) on your incision, which will start to peel off on their own over the next 2-3 weeks. Do not pick at or peel off the Steristrips.   - Do not apply soaps, creams, lotions, ointments or powders to the incision until it is completely healed.  You should call the doctor if:   - you notice redness, drainage, swelling, increased tenderness, hot sensation around the  incision, bleeding or incision edges pulling apart.  - your temperature is greater than 100 degress F for more than 24 hours.  - you notice swelling or bulging at the incision or around the device that was not there when you left the hospital or is increasing in size.  -you experience increased difficulty breathing.  - you notice new/worsening swelling in your legs and ankles.  - you faint or have dizzy spells.  -you have any questions or concerns regarding your device or the procedure. Principal Discharge DX:	Tachy-alexander syndrome  Goal:	NSR s/p pacemaker implant  Instructions for follow-up, activity and diet:	Post Operative Pacemaker Device Instructions  -Please schedule a 7-10 day follow up with [facility] Device clinic  - Bruising around the implant site or over the chest, side or arm near the incision is normal, and will take a few weeks to resolve.  -Do not lift the affected arm higher than 90 degrees (shoulder height) in any direction for 6 weeks.   - Do not push, pull or lift anything heavier than 10 lbs (about a gallon of milk) with the affected arm for 4 weeks.     -keep site dry for one week and dressing on until follow up with [facility]  - Do not touch the incision until it is completely healed.   - There are Steristrips (white strips of tape) on your incision, which will start to peel off on their own over the next 2-3 weeks. Do not pick at or peel off the Steristrips.   - Do not apply soaps, creams, lotions, ointments or powders to the incision until it is completely healed.  You should call the doctor if:   - you notice redness, drainage, swelling, increased tenderness, hot sensation around the  incision, bleeding or incision edges pulling apart.  - your temperature is greater than 100 degress F for more than 24 hours.  - you notice swelling or bulging at the incision or around the device that was not there when you left the hospital or is increasing in size.  -you experience increased difficulty breathing.  - you notice new/worsening swelling in your legs and ankles.  - you faint or have dizzy spells.  -you have any questions or concerns regarding your device or the procedure.

## 2017-04-25 NOTE — DISCHARGE NOTE ADULT - MEDICATION SUMMARY - MEDICATIONS TO STOP TAKING
I will STOP taking the medications listed below when I get home from the hospital:    Lasix 40 mg oral tablet  -- 1 tab(s) by mouth once a day    metoprolol  --   12.5mg 2 times daily    potassium chloride 20 mEq/15 mL oral liquid  -- 15 milliliter(s) by mouth once a day    acetaminophen-hydrocodone  --  by mouth , As Needed    hydrALAZINE 10 mg oral tablet  --  by mouth 2 times a day

## 2017-04-25 NOTE — DISCHARGE NOTE ADULT - MEDICATION SUMMARY - MEDICATIONS TO TAKE
I will START or STAY ON the medications listed below when I get home from the hospital:    acetaminophen 325 mg oral tablet  -- 2 tab(s) by mouth every 6 hours, As needed, Mild Pain  -- Indication: For Pain    OxyCONTIN 10 mg oral tablet, extended release  -- 1 tab(s) by mouth every 12 hours  -- Indication: For Pain    dilTIAZem 300 mg/24 hours oral capsule, extended release  -- 1 cap(s) by mouth once a day  -- Indication: For heart rate    amitriptyline 25 mg oral tablet  -- 1 tab(s) by mouth once a day (at bedtime)  -- Indication: For Pain    atorvastatin 40 mg oral tablet  -- 1 tab(s) by mouth once a day (at bedtime)  -- Indication: For heart    Plavix 75 mg oral tablet  -- 1 tab(s) by mouth once a day  -- Indication: For heart    ALPRAZolam 0.5 mg oral tablet  -- 1 tab(s) by mouth 2 times a day, As needed, anxiety  -- Indication: For Axiety    Prolia 60 mg/mL subcutaneous solution  --  subcutaneous   -- Indication: For bone    Tudorza Pressair 400 mcg/inh inhalation powder  -- 1 puff(s) inhaled 2 times a day  -- Indication: For breathing    albuterol 2.5 mg/3 mL (0.083%) inhalation solution  -- 1 unit dose via nebulizer every 4hrs as needed for wheezing or shortness of breath  -- Indication: For breathing    amLODIPine 5 mg oral tablet  -- 1 tab(s) by mouth once a day  -- Indication: For blood pressure    cephalexin 500 mg oral capsule  -- 1 cap(s) by mouth 2 times a day  -- Indication: For Antibiotic     ferrous sulfate 324 mg oral delayed release tablet  -- 1 milligram(s) by mouth 2 times a day  -- Indication: For Anemia    Senna - oral tablet  -- 2 tab(s) by mouth once a day  -- Indication: For COsntiaption    pantoprazole 40 mg oral delayed release tablet  -- 1 tab(s) by mouth 2 times a day (before meals)  -- Indication: For stomach protecting    Multi-Day Plus Minerals Multiple Vitamins with Minerals oral tablet  -- 1 tab(s) by mouth once a day  -- Indication: For vitamin

## 2017-04-25 NOTE — DISCHARGE NOTE ADULT - CARE PROVIDER_API CALL
Martin Red), Cardiac Electrophysiology; Cardiovascular Disease  [address] [place] [address]  Phone: [phone]  Fax: [phone] Martin Red), Cardiac Electrophysiology; Cardiovascular Disease  [address] [place] [address]  Phone: [phone]  Fax: [phone]    Tae Hidalgo), Internal Medicine  [address] [place] [address]  Phone: [phone]  Fax: [phone]

## 2017-04-25 NOTE — PROGRESS NOTE ADULT - PROBLEM SELECTOR PLAN 1
D/C heparin NO AC per Dr. Crowder for one week  D/C cardizem IV  Begin cardizem 120 CD po daily w/ parameters  Ancef Gm II Q8 x 2 doses then Keflex 500 mg po q12 x 5 days  Pressure dressing to be removed in morning  PA/lat CXR in AM w/labs and ECG  Pt instructed not to raise left arm above shoulder x6 weeks  CBR 6 hours  F/U [place] 10-14 days

## 2017-04-25 NOTE — DISCHARGE NOTE ADULT - HOSPITAL COURSE
s/p [employer] PPM Accolade DDD 60/120 for tachy alexander SSS w/paroxysmal atrial tachycardia LACW admittted witha flutter and sinus pauses. SSS. PPM placed. followed by site hematoma H/H remained stable. was on pressure dressing x 2 days. now on regular dressing and stable.  s/p [employer] PPM Accolade DDD 60/120 for tachy alexander SSS w/paroxysmal atrial tachycardia LACW. Medically stable and agreeable with discharge and follow up plan. Patient was advised to return to ED if any symptoms occur or worsen.      Vital Signs Last 24 Hrs  T(C): 36.6, Max: 36.6 (04-27 @ 17:12)  T(F): 97.9, Max: 97.9 (04-27 @ 17:12)  HR: 101 (101 - 127)  BP: 120/66 (109/64 - 180/70)  BP(mean): --  RR: 16 (16 - 16)  SpO2: 92% (92% - 100%)    ACCUCHECKS    PHYSICAL EXAM-  GENERAL: severe cachexia  HEAD:  Atraumatic, Normocephalic  EYES: EOMI, PERRLA, conjunctiva and sclera clear  ENMT: Moist mucous membranes,   NECK: Supple, No JVD, Normal thyroid  NERVOUS SYSTEM:  Alert & Oriented X 3, Motor Strength 5/5 B/L upper and lower extremities;  CHEST/LUNG:  No rales, rhonchi, wheezing, or rubs  HEART: Regular rate and rhythm; No murmurs, rubs, or gallops  ABDOMEN: Soft, Nontender, Nondistended; Bowel sounds present  EXTREMITIES:  2+ Peripheral Pulses, No clubbing, cyanosis, or edema  LYMPH: No lymphadenopathy noted  SKIN: No rashes or lesions; left CW with hematoma PPM site and now with regular dressing. no arm edema, radial pulses +    LABS:                        14.8   12.2  )-----------( 291      ( 28 Apr 2017 05:29 )             42.4     04-27    132<L>  |  94<L>  |  14.0  ----------------------------<  97  3.4<L>   |  16.0<L>  |  0.62    Ca    8.1<L>      27 Apr 2017 07:41  Mg     2.1     04-27    Time: 45 mins

## 2017-04-25 NOTE — DISCHARGE NOTE ADULT - MEDICATION SUMMARY - MEDICATIONS TO CHANGE
I will SWITCH the dose or number of times a day I take the medications listed below when I get home from the hospital:    Cardizem 180 mg/24 hours oral tablet, extended release  -- 1 tab(s) by mouth once a day

## 2017-04-25 NOTE — PROGRESS NOTE ADULT - SUBJECTIVE AND OBJECTIVE BOX
TYLER LANGSTON    [id number]    76y      Female    INTERVAL HPI/OVERNIGHT EVENTS: Offers no complaints. "I feel good"    Hospital course:  76 y F hx CAD, COPD on home O2 qhs, CVA, chronic back pain, afib, PAD s/p stents, presented to ER c/o 2 d hx n/v, palpitations, chest discomfort. Diltiazem and metoprolol stopped 2 d ago by Dr Miranda for sinus pauses noted on recent holter monitoring. Denies F/C, cough, SOB, diarrhea, abd pain, h/a, LE edema. noted to be in aflutter in ED w HR 120s-180s, evaluated by cardiology and started on cardizem drip.     REVIEW OF SYSTEMS:    CONSTITUTIONAL: No fever   RESPIRATORY: No cough; No shortness of breath  CARDIOVASCULAR: No chest pain, palpitations  GASTROINTESTINAL: No abdominal pain    Vital Signs Last 24 Hrs  T(C): 37, Max: 37.7 ( @ 15:30)  T(F): 98.6, Max: 99.9 ( @ 15:30)  HR: 116 (108 - 137)  BP: 138/72 (138/72 - 155/70)  BP(mean): --  RR: 18 (17 - 22)  SpO2: 93% (93% - 98%)    PHYSICAL EXAM:    GENERAL: NAD, cachetic  HEENT: PERRL, +EOMI, MMM  NECK: soft, Supple, No JVD,   CHEST/LUNG: Clear to percussion bilaterally; No wheezing  HEART: S1S2+  ABDOMEN: Soft, Nontender, Nondistended; Bowel sounds present        LABS:                        15.0   13.4  )-----------( 346      ( 2017 07:25 )             43.0         135  |  99  |  16.0  ----------------------------<  124<H>  3.2<L>   |  17.0<L>  |  0.58    Ca    8.2<L>      2017 07:25  Mg     1.9         TPro  8.6  /  Alb  4.7  /  TBili  0.7  /  DBili  x   /  AST  33<H>  /  ALT  13  /  AlkPhos  69      PT/INR - ( 2017 14:22 )   PT: 11.8 sec;   INR: 1.07 ratio         PTT - ( 2017 07:39 )  PTT:65.7 sec  Urinalysis Basic - ( 2017 20:17 )    Color: Yellow / Appearance: Clear / S.010 / pH: x  Gluc: x / Ketone: Trace  / Bili: Negative / Urobili: Negative mg/dL   Blood: x / Protein: 100 mg/dL / Nitrite: Negative   Leuk Esterase: Negative / RBC: 0-2 /HPF / WBC Negative   Sq Epi: x / Non Sq Epi: Occasional / Bacteria: x          MEDICATIONS  (STANDING):  diltiazem Infusion 10mG/Hr IV Continuous <Continuous>  oxyCODONE ER Tablet 10milliGRAM(s) Oral every 12 hours  amitriptyline 25milliGRAM(s) Oral at bedtime  atorvastatin 40milliGRAM(s) Oral at bedtime  clopidogrel Tablet 75milliGRAM(s) Oral daily  pantoprazole    Tablet 40milliGRAM(s) Oral before breakfast  tiotropium 18 MICROgram(s) Capsule 1Capsule(s) Inhalation daily  heparin  Infusion. Unit(s)/Hr IV Continuous <Continuous>    MEDICATIONS  (PRN):  oxyCODONE  5 mG/acetaminophen 325 mG 1Tablet(s) Oral every 6 hours PRN Severe Pain (7 - 10)  ALBUTerol    90 MICROgram(s) HFA Inhaler 2Puff(s) Inhalation every 6 hours PRN Shortness of Breath  heparin  Injectable 3000Unit(s) IV Push every 6 hours PRN For aPTT less than 40  heparin  Injectable 1500Unit(s) IV Push every 6 hours PRN For aPTT between 40 - 57  ALPRAZolam 0.25milliGRAM(s) Oral daily PRN Anxiety      RADIOLOGY & ADDITIONAL TESTS:

## 2017-04-25 NOTE — PROGRESS NOTE ADULT - ASSESSMENT
A- [employer] Accolade PPM LACW for SSS/tachy-alexander syndrome/paroxysmal atrial tachycardia

## 2017-04-25 NOTE — PROGRESS NOTE ADULT - SUBJECTIVE AND OBJECTIVE BOX
s/p implantation of BSC Accolade PPM DDDR 60/120 LACW for SSS/David Tachy and paroxysmal atrial tachycardia  Tolerated procedure well and seen post procedure by Dr Red with family present    REVIEW OF SYSTEMS:  Denies SOB, CP, NV, HA, dizziness, palpitations, site pain    PHYSICAL EXAM: A&Ox3 NAD Skin warm and dry  NEURO: Speech intact +gag +swallow Tongue midline CHOU  NECK: No JVD, trachea midline. Eupneic  HEART: RRR S1S2 no g/m SR-ST  on tele ECG:  bpm w/RBBB ST abn  PULMONARY:  CTA valentin CXR shows RA/RV lead Radiology report pending  ABDOMEN: Soft nontender X4 +BS Vdg/ice chips tolerated  EXTREMITIES: Lt Radial site: Rt radial pulse + w/pulse ox on right index finger SaO2>95% LUE w/oneurovascular deficit. Capillary refill <3 sec  LACW: Pressure dressing applied no bleed, hematoma, ecchymosis, swelling or pain (pt was on heparin). Sling applied to LUE

## 2017-04-25 NOTE — DISCHARGE NOTE ADULT - INSTRUCTIONS
post device insertion. Keep site dry for 2 weeks. monitor and look for swelling drainage fever redness. If swelling drainage or redness is noted please call your doctor immediately. increase caloric intake

## 2017-04-25 NOTE — DISCHARGE NOTE ADULT - NS AS ACTIVITY OBS
Do not make important decisions/No Heavy lifting/straining/Do not drive or operate machinery Do not make important decisions/Walking-Outdoors allowed/Do not drive or operate machinery/Walking-Indoors allowed/No Heavy lifting/straining

## 2017-04-26 DIAGNOSIS — E87.2 ACIDOSIS: ICD-10-CM

## 2017-04-26 DIAGNOSIS — R09.89 OTHER SPECIFIED SYMPTOMS AND SIGNS INVOLVING THE CIRCULATORY AND RESPIRATORY SYSTEMS: ICD-10-CM

## 2017-04-26 DIAGNOSIS — R64 CACHEXIA: ICD-10-CM

## 2017-04-26 DIAGNOSIS — K21.0 GASTRO-ESOPHAGEAL REFLUX DISEASE WITH ESOPHAGITIS: ICD-10-CM

## 2017-04-26 LAB
ANION GAP SERPL CALC-SCNC: 24 MMOL/L — HIGH (ref 5–17)
APTT BLD: 30.8 SEC — SIGNIFICANT CHANGE UP (ref 27.5–37.4)
BASE EXCESS BLDA CALC-SCNC: -4.2 MMOL/L — LOW (ref -3–3)
BLOOD GAS COMMENTS ARTERIAL: SIGNIFICANT CHANGE UP
BUN SERPL-MCNC: 13 MG/DL — SIGNIFICANT CHANGE UP (ref 8–20)
CALCIUM SERPL-MCNC: 7.8 MG/DL — LOW (ref 8.6–10.2)
CHLORIDE SERPL-SCNC: 98 MMOL/L — SIGNIFICANT CHANGE UP (ref 98–107)
CO2 SERPL-SCNC: 13 MMOL/L — LOW (ref 22–29)
CREAT SERPL-MCNC: 0.53 MG/DL — SIGNIFICANT CHANGE UP (ref 0.5–1.3)
GAS PNL BLDA: SIGNIFICANT CHANGE UP
GLUCOSE SERPL-MCNC: 95 MG/DL — SIGNIFICANT CHANGE UP (ref 70–115)
HCO3 BLDA-SCNC: 21 MMOL/L — SIGNIFICANT CHANGE UP (ref 20–26)
HCT VFR BLD CALC: 44.7 % — SIGNIFICANT CHANGE UP (ref 37–47)
HGB BLD-MCNC: 15.7 G/DL — SIGNIFICANT CHANGE UP (ref 12–16)
HOROWITZ INDEX BLDA+IHG-RTO: 21 — SIGNIFICANT CHANGE UP
MAGNESIUM SERPL-MCNC: 1.9 MG/DL — SIGNIFICANT CHANGE UP (ref 1.8–2.5)
MCHC RBC-ENTMCNC: 30.6 PG — SIGNIFICANT CHANGE UP (ref 27–31)
MCHC RBC-ENTMCNC: 35.1 G/DL — SIGNIFICANT CHANGE UP (ref 32–36)
MCV RBC AUTO: 87.1 FL — SIGNIFICANT CHANGE UP (ref 81–99)
PCO2 BLDA: 24 MMHG — LOW (ref 35–45)
PH BLDA: 7.46 — HIGH (ref 7.35–7.45)
PLATELET # BLD AUTO: 336 K/UL — SIGNIFICANT CHANGE UP (ref 150–400)
PO2 BLDA: 124 MMHG — HIGH (ref 83–108)
POTASSIUM SERPL-MCNC: 3.2 MMOL/L — LOW (ref 3.5–5.3)
POTASSIUM SERPL-SCNC: 3.2 MMOL/L — LOW (ref 3.5–5.3)
RBC # BLD: 5.13 M/UL — SIGNIFICANT CHANGE UP (ref 4.4–5.2)
RBC # FLD: 13.7 % — SIGNIFICANT CHANGE UP (ref 11–15.6)
SAO2 % BLDA: 99 % — SIGNIFICANT CHANGE UP (ref 95–99)
SODIUM SERPL-SCNC: 135 MMOL/L — SIGNIFICANT CHANGE UP (ref 135–145)
WBC # BLD: 15.8 K/UL — HIGH (ref 4.8–10.8)
WBC # FLD AUTO: 15.8 K/UL — HIGH (ref 4.8–10.8)

## 2017-04-26 PROCEDURE — 99233 SBSQ HOSP IP/OBS HIGH 50: CPT

## 2017-04-26 PROCEDURE — 71010: CPT | Mod: 26

## 2017-04-26 PROCEDURE — 93010 ELECTROCARDIOGRAM REPORT: CPT

## 2017-04-26 PROCEDURE — 99223 1ST HOSP IP/OBS HIGH 75: CPT

## 2017-04-26 PROCEDURE — 71260 CT THORAX DX C+: CPT | Mod: 26

## 2017-04-26 PROCEDURE — 74177 CT ABD & PELVIS W/CONTRAST: CPT | Mod: 26

## 2017-04-26 RX ORDER — POTASSIUM CHLORIDE 20 MEQ
40 PACKET (EA) ORAL EVERY 4 HOURS
Qty: 0 | Refills: 0 | Status: COMPLETED | OUTPATIENT
Start: 2017-04-26 | End: 2017-04-26

## 2017-04-26 RX ORDER — HYDRALAZINE HCL 50 MG
10 TABLET ORAL
Qty: 0 | Refills: 0 | Status: DISCONTINUED | OUTPATIENT
Start: 2017-04-26 | End: 2017-04-28

## 2017-04-26 RX ORDER — ENOXAPARIN SODIUM 100 MG/ML
40 INJECTION SUBCUTANEOUS EVERY 24 HOURS
Qty: 0 | Refills: 0 | Status: DISCONTINUED | OUTPATIENT
Start: 2017-04-26 | End: 2017-04-26

## 2017-04-26 RX ORDER — AMLODIPINE BESYLATE 2.5 MG/1
5 TABLET ORAL DAILY
Qty: 0 | Refills: 0 | Status: DISCONTINUED | OUTPATIENT
Start: 2017-04-26 | End: 2017-04-28

## 2017-04-26 RX ORDER — POTASSIUM CHLORIDE 20 MEQ
40 PACKET (EA) ORAL EVERY 4 HOURS
Qty: 0 | Refills: 0 | Status: DISCONTINUED | OUTPATIENT
Start: 2017-04-26 | End: 2017-04-26

## 2017-04-26 RX ADMIN — Medication 500 MILLIGRAM(S): at 19:49

## 2017-04-26 RX ADMIN — OXYCODONE HYDROCHLORIDE 10 MILLIGRAM(S): 5 TABLET ORAL at 05:43

## 2017-04-26 RX ADMIN — Medication 100 MILLIGRAM(S): at 03:10

## 2017-04-26 RX ADMIN — Medication 0.25 MILLIGRAM(S): at 10:05

## 2017-04-26 RX ADMIN — Medication 40 MILLIEQUIVALENT(S): at 11:06

## 2017-04-26 RX ADMIN — Medication 500 MILLIGRAM(S): at 05:43

## 2017-04-26 RX ADMIN — Medication 40 MILLIEQUIVALENT(S): at 18:30

## 2017-04-26 RX ADMIN — ATORVASTATIN CALCIUM 40 MILLIGRAM(S): 80 TABLET, FILM COATED ORAL at 23:23

## 2017-04-26 RX ADMIN — Medication 100 MILLIGRAM(S): at 11:06

## 2017-04-26 RX ADMIN — CLOPIDOGREL BISULFATE 75 MILLIGRAM(S): 75 TABLET, FILM COATED ORAL at 11:06

## 2017-04-26 RX ADMIN — ENOXAPARIN SODIUM 40 MILLIGRAM(S): 100 INJECTION SUBCUTANEOUS at 11:06

## 2017-04-26 RX ADMIN — OXYCODONE HYDROCHLORIDE 10 MILLIGRAM(S): 5 TABLET ORAL at 07:10

## 2017-04-26 RX ADMIN — Medication 0.25 MILLIGRAM(S): at 20:33

## 2017-04-26 RX ADMIN — Medication 10 MILLIGRAM(S): at 19:49

## 2017-04-26 NOTE — PROGRESS NOTE ADULT - ASSESSMENT
76 y F hx CAD, COPD, CVA, PAD, chronic back pain, anxiety, HTN, mod-severe MR here with atrial flutter.

## 2017-04-26 NOTE — PROGRESS NOTE ADULT - SUBJECTIVE AND OBJECTIVE BOX
[facility]                                   [address] [place] [address]                                                      PHONE: [phone]                                                         FAX: [phone]  [email or web address]  ---------------------------------------------------------------------------------------------------------------------------------    Overnight events/patient complaints: s/p DDD apcer with normal function, aflutter converted to SR with St, h/o visual loss ? TIA as per neuro      fiberglass (Rash)  No Known Drug Allergies    MEDICATIONS  (STANDING):  oxyCODONE ER Tablet 10milliGRAM(s) Oral every 12 hours  amitriptyline 25milliGRAM(s) Oral at bedtime  atorvastatin 40milliGRAM(s) Oral at bedtime  clopidogrel Tablet 75milliGRAM(s) Oral daily  pantoprazole    Tablet 40milliGRAM(s) Oral before breakfast  tiotropium 18 MICROgram(s) Capsule 1Capsule(s) Inhalation daily  ceFAZolin   IVPB 2000milliGRAM(s) IV Intermittent every 8 hours  cephalexin 500milliGRAM(s) Oral <User Schedule>  diltiazem   CD 120milliGRAM(s) Oral daily  potassium chloride   Powder 40milliEquivalent(s) Oral every 4 hours    MEDICATIONS  (PRN):  oxyCODONE  5 mG/acetaminophen 325 mG 1Tablet(s) Oral every 6 hours PRN Severe Pain (7 - 10)  ALBUTerol    90 MICROgram(s) HFA Inhaler 2Puff(s) Inhalation every 6 hours PRN Shortness of Breath  ALPRAZolam 0.25milliGRAM(s) Oral three times a day PRN Anxiety  acetaminophen   Tablet. 650milliGRAM(s) Oral every 6 hours PRN Mild Pain (1 - 3)  aluminum hydroxide/magnesium hydroxide/simethicone Suspension 30milliLiter(s) Oral every 4 hours PRN Dyspepsia  ondansetron Injectable 4milliGRAM(s) IV Push every 8 hours PRN Nausea and/or Vomiting      Vital Signs Last 24 Hrs  T(C): 37.1, Max: 37.2 ( @ 11:47)  T(F): 98.7, Max: 98.9 ( @ 11:47)  HR: 110 (91 - 110)  BP: 160/88 (120/56 - 171/72)  BP(mean): --  RR: 18 (16 - 20)  SpO2: 97% (94% - 97%)  Daily     Daily Weight in k.5 (2017 05:00)  ICU Vital Signs Last 24 Hrs  TYLER LANGSTON  I&O's Detail    I & Os for current day (as of 2017 09:12)  =============================================  IN:    Oral Fluid: 360 ml    IV PiggyBack: 150 ml    diltiazem Infusion: 80 ml    heparin  Infusion.: 56 ml    Total IN: 646 ml  ---------------------------------------------  OUT:    Voided: 1300 ml    Total OUT: 1300 ml  ---------------------------------------------  Total NET: -654 ml    I&O's Summary    I & Os for current day (as of 2017 09:12)  =============================================  IN: 646 ml / OUT: 1300 ml / NET: -654 ml    Drug Dosing Weight  TYLER IVIS      PHYSICAL EXAM:  General: Appears well developed, well nourished alert and cooperative.  HEENT: Head; normocephalic, atraumatic.  Eyes: Pupils reactive, cornea wnl.  Neck: Supple, no nodes adenopathy, no NVD or carotid bruit or thyromegaly.  CARDIOVASCULAR: Normal S1 and S2, No murmur, rub, gallop or lift.   LUNGS: No rales, rhonchi or wheeze. Normal breath sounds bilaterally.  ABDOMEN: Soft, nontender without mass or organomegaly. bowel sounds normoactive.  EXTREMITIES: No clubbing, cyanosis or edema. Distal pulses wnl.   SKIN: warm and dry with normal turgor.  NEURO: Alert/oriented x 3/normal motor exam. No pathologic reflexes.    PSYCH: normal affect.        LABS:                        15.7   15.8  )-----------( 336      ( 2017 07:31 )             44.7     -    135  |  98  |  13.0  ----------------------------<  95  3.2<L>   |  13.0<L>  |  0.53    Ca    7.8<L>      2017 07:31  Mg     1.9         TPro  8.6  /  Alb  4.7  /  TBili  0.7  /  DBili  x   /  AST  33<H>  /  ALT  13  /  AlkPhos  69  04-24    TYLER LANGSTON  CARDIAC MARKERS ( 2017 00:11 )  x     / 0.04 ng/mL / x     / x     / x      CARDIAC MARKERS ( 2017 14:22 )  x     / 0.05 ng/mL / 256 U/L / x     / 12.7 ng/mL      PT/INR - ( 2017 14:22 )   PT: 11.8 sec;   INR: 1.07 ratio         PTT - ( 2017 07:31 )  PTT:30.8 sec  Urinalysis Basic - ( 2017 20:17 )    Color: Yellow / Appearance: Clear / S.010 / pH: x  Gluc: x / Ketone: Trace  / Bili: Negative / Urobili: Negative mg/dL   Blood: x / Protein: 100 mg/dL / Nitrite: Negative   Leuk Esterase: Negative / RBC: 0-2 /HPF / WBC Negative   Sq Epi: x / Non Sq Epi: Occasional / Bacteria: x        RADIOLOGY & ADDITIONAL STUDIES:Impression:  Cardiomegaly. Clear lungs..                CARLOS CROCKETT M.D., ATTENDING RADIOLOGIST  This document has been electronically signed. 2017  3:53PM    INTERPRETATION OF TELEMETRY (personally reviewed):    ECG:Diagnosis Line Sinus tachycardia with Premature supraventricular complexes  Incomplete right bundle branch block  Possible Right ventricular hypertrophy  ST & T wave abnormality, consider inferior ischemia  Abnormal ECG    Confirmed by GERRY JULIEN ([id number]) on 2017 6:46:24 PM

## 2017-04-26 NOTE — PROGRESS NOTE ADULT - ASSESSMENT
Assessment  Parox aflutter in SR  Tachybrady s/p DDD pacer  H/O TIA  COPD    Rec  Increase cardizem   Will discuss NOAC with Dr Hidalgo, if no prior episodes of bleeding given ChadsVasc score with aflutter ideal situation is AC Assessment  Parox aflutter in SR  Tachybrady s/p DDD pacer  H/O TIA  COPD    Rec  Increase cardizem   Will discuss NOAC with Dr Hidalgo, if no prior episodes of bleeding given ChadsVasc score with aflutter ideal situation is AC    Addendum:  Spoke with Dr Hidalgo pt has had h/o GIB on asa not plavix and also has had anorexia and weight loss.  Would rec GI consult for EGD ( cardiac status stable)prior to starting eliquis, continue plavix for now Assessment  Parox aflutter in SR  Tachybrady s/p DDD pacer  H/O TIA  COPD    Rec  Increase cardizem   Will discuss NOAC with Dr Hidalgo, if no prior episodes of bleeding given ChadsVasc score with aflutter ideal situation is AC    Addendum:  Spoke with Dr Hidalgo pt has had h/o GIB on asa not plavix and also has had anorexia and weight loss.  Would rec GI consult for EGD ( cardiac status stable)prior to starting eliquis, continue plavix for now  Also, Ep prefers no AC with recent pacer pocket.  Will keep on plavix and start eliquis as outpt with protonix BID if GI approves

## 2017-04-26 NOTE — PROGRESS NOTE ADULT - PROBLEM SELECTOR PLAN 2
4/24 CT chest showing enlarged left pulmonary artery  EKG on admission showing incomplete RBBB  Echo pending  CTA chest pending - r/o PE

## 2017-04-26 NOTE — PROGRESS NOTE ADULT - ATTENDING COMMENTS
Addendum: 3:52pm  Per cardiology, will hold off on eliquis for now. Resumption will be coordinated as outpatient. To continue with plavix.

## 2017-04-26 NOTE — PROGRESS NOTE ADULT - SUBJECTIVE AND OBJECTIVE BOX
TYLER LANGSTON    [id number]    76y      Female    INTERVAL HPI/OVERNIGHT EVENTS: S/p Pacemaker. Feels well.     Hospital course:  76 y F hx CAD, COPD on home O2 qhs, CVA, chronic back pain, afib, PAD s/p stents, presented to ER c/o 2 d hx n/v, palpitations, chest discomfort. Diltiazem and metoprolol stopped 2 d ago by Dr Miranda for sinus pauses noted on recent holter monitoring. Denies F/C, cough, SOB, diarrhea, abd pain, h/a, LE edema. noted to be in aflutter in ED w HR 120s-180s, evaluated by cardiology and started on cardizem drip.  - had pacemaker placed. Now off cardizem and heparin gtt.     REVIEW OF SYSTEMS:    CONSTITUTIONAL: No fever  RESPIRATORY: No cough; No shortness of breath  CARDIOVASCULAR: No chest pain, palpitations      Vital Signs Last 24 Hrs  T(C): 37.1, Max: 37.2 ( @ 11:47)  T(F): 98.7, Max: 98.9 ( @ 11:47)  HR: 125 (91 - 125)  BP: 160/88 (120/56 - 171/72)  BP(mean): --  RR: 18 (16 - 20)  SpO2: 99% (94% - 99%) RA    PHYSICAL EXAM:    GENERAL: NAD, cachetic   HEENT: PERRL, +EOMI, MMM  NECK: soft, Supple, No JVD,   CHEST/LUNG: Clear to percussion bilaterally; No wheezing  HEART: S1S2+, Regular rate and rhythm   ABDOMEN: Soft, Nontender, Nondistended; Bowel sounds present    LABS:                        15.7   15.8  )-----------( 336      ( 2017 07:31 )             44.7         135  |  98  |  13.0  ----------------------------<  95  3.2<L>   |  13.0<L>  |  0.53    Ca    7.8<L>      2017 07:31  Mg     1.9         TPro  8.6  /  Alb  4.7  /  TBili  0.7  /  DBili  x   /  AST  33<H>  /  ALT  13  /  AlkPhos  69  -24    PT/INR - ( 2017 14:22 )   PT: 11.8 sec;   INR: 1.07 ratio         PTT - ( 2017 07:31 )  PTT:30.8 sec  Urinalysis Basic - ( 2017 20:17 )    Color: Yellow / Appearance: Clear / S.010 / pH: x  Gluc: x / Ketone: Trace  / Bili: Negative / Urobili: Negative mg/dL   Blood: x / Protein: 100 mg/dL / Nitrite: Negative   Leuk Esterase: Negative / RBC: 0-2 /HPF / WBC Negative   Sq Epi: x / Non Sq Epi: Occasional / Bacteria: x          MEDICATIONS  (STANDING):  oxyCODONE ER Tablet 10milliGRAM(s) Oral every 12 hours  amitriptyline 25milliGRAM(s) Oral at bedtime  atorvastatin 40milliGRAM(s) Oral at bedtime  clopidogrel Tablet 75milliGRAM(s) Oral daily  pantoprazole    Tablet 40milliGRAM(s) Oral before breakfast  tiotropium 18 MICROgram(s) Capsule 1Capsule(s) Inhalation daily  ceFAZolin   IVPB 2000milliGRAM(s) IV Intermittent every 8 hours  cephalexin 500milliGRAM(s) Oral <User Schedule>  potassium chloride   Powder 40milliEquivalent(s) Oral every 4 hours  diltiazem   CD 240milliGRAM(s) Oral daily    MEDICATIONS  (PRN):  oxyCODONE  5 mG/acetaminophen 325 mG 1Tablet(s) Oral every 6 hours PRN Severe Pain (7 - 10)  ALBUTerol    90 MICROgram(s) HFA Inhaler 2Puff(s) Inhalation every 6 hours PRN Shortness of Breath  ALPRAZolam 0.25milliGRAM(s) Oral three times a day PRN Anxiety  acetaminophen   Tablet. 650milliGRAM(s) Oral every 6 hours PRN Mild Pain (1 - 3)  aluminum hydroxide/magnesium hydroxide/simethicone Suspension 30milliLiter(s) Oral every 4 hours PRN Dyspepsia  ondansetron Injectable 4milliGRAM(s) IV Push every 8 hours PRN Nausea and/or Vomiting      RADIOLOGY & ADDITIONAL TESTS:

## 2017-04-26 NOTE — PROGRESS NOTE ADULT - PROBLEM SELECTOR PLAN 1
S/p pacemaker.  Per cardiology, will need EGD prior to starting long term anticoagulation given pt's h/o GIB while on ASA. Eval pending ([place]).  Appreciate cardiology and EP recs

## 2017-04-26 NOTE — CONSULT NOTE ADULT - PROBLEM SELECTOR RECOMMENDATION 9
Patient with esophagitis on EGD done less than one year ago. No need for repeat EGD. May start Elaquis.      Must continue Protonix bid indefinitely. Patient with esophagitis on EGD done less than one year ago. No need for repeat EGD. May start Elaquis.      Must continue prevacid bid indefinitely. F/U with  Dr Rodriguez after Discharge

## 2017-04-26 NOTE — PROGRESS NOTE ADULT - SUBJECTIVE AND OBJECTIVE BOX
follow up POD#1 s/p dual chamber BSCI ppm secondary to tachybrady syndrome  seen and examined in echo room on 4t  no complaints, denies CP, palp, SOB,    CXR 4/25/17: prelim +RA/RV lead, no gross PTX  EKG: ST 120bpm, RBBB,  TELE: ASVS, -120bpm  BSCI interrogation: normal device function, sensing, impedance and thresholds     MEDICATIONS  (STANDING):  oxyCODONE ER Tablet 10milliGRAM(s) Oral every 12 hours  amitriptyline 25milliGRAM(s) Oral at bedtime  atorvastatin 40milliGRAM(s) Oral at bedtime  clopidogrel Tablet 75milliGRAM(s) Oral daily  pantoprazole    Tablet 40milliGRAM(s) Oral before breakfast  tiotropium 18 MICROgram(s) Capsule 1Capsule(s) Inhalation daily  cephalexin 500milliGRAM(s) Oral <User Schedule>  potassium chloride   Powder 40milliEquivalent(s) Oral every 4 hours  diltiazem   CD 240milliGRAM(s) Oral daily    MEDICATIONS  (PRN):  oxyCODONE  5 mG/acetaminophen 325 mG 1Tablet(s) Oral every 6 hours PRN Severe Pain (7 - 10)  ALBUTerol    90 MICROgram(s) HFA Inhaler 2Puff(s) Inhalation every 6 hours PRN Shortness of Breath  ALPRAZolam 0.25milliGRAM(s) Oral three times a day PRN Anxiety  acetaminophen   Tablet. 650milliGRAM(s) Oral every 6 hours PRN Mild Pain (1 - 3)  aluminum hydroxide/magnesium hydroxide/simethicone Suspension 30milliLiter(s) Oral every 4 hours PRN Dyspepsia  ondansetron Injectable 4milliGRAM(s) IV Push every 8 hours PRN Nausea and/or Vomiting      Allergies  fiberglass (Rash)  No Known Drug Allergies      PAST MEDICAL & SURGICAL HISTORY:  Dyslipidemia  RBBB  Respiratory failure  PVD (peripheral vascular disease)  HTN (hypertension)  Bradycardia  Renal artery anomaly  Vascular disease  CAD (coronary artery disease)  Atrial septal aneurysm  Cardiomyopathy  Hyperlipemia  Hypertension  CVA (cerebral infarction)  COPD (chronic obstructive pulmonary disease)  Atrial fibrillation  Cardiac arrest  Pulmonary edema  S/P appendectomy  S/P D&amp;C (status post dilation and curettage)      Vital Signs Last 24 Hrs  T(C): 36.7, Max: 37.1 (04-26 @ 05:45)  T(F): 98, Max: 98.7 (04-26 @ 05:45)  HR: 113 (91 - 125)  BP: 190/100 (120/60 - 190/100)  RR: 19 (16 - 20)  SpO2: 99% (94% - 99%)    Physical Exam:  constitutional: NAD, AAO x3   LACW: pressure dsg removed, continue tegaderm, no evidence of bleeding, swelling, hematoma +2 radial pulse      LABS:                        15.7   15.8  )-----------( 336      ( 26 Apr 2017 07:31 )             44.7     04-26    135  |  98  |  13.0  ----------------------------<  95  3.2<L>   |  13.0<L>  |  0.53    Ca    7.8<L>      26 Apr 2017 07:31  Mg     1.9     04-26    A/P: 77 yo female with extensive medical hx POD#1 s/p dual chamber BSCI ppm DDD . Currently hypertensive and tachycardic, unclear etiology at this time.    1. tachybrady: s/p ppm as above  -continue keflex 500mg po bid x 5 days  -sling as per pt request, reviewed shoulder mobilization to avoid frozen shoulder  -continue dressing until f/u [facility] 7-10days  -CXR PA/LAT prior to discharge to eval lead placement  -interrogation this am wnl  -no a/c at this time, await gi workup as per Dr Lopez  -device care/restrictions/follow up reviewed with pt, all questions answered    2. Sinus tach: unclear etiology-plan per primary medical and cardiology team  -prelim echo without pericardial effusion  -cxr this am pending, but good lead placement on xray last night and interrogation this am wnl    EPS will s/o, continued plan per [facility]

## 2017-04-26 NOTE — PROGRESS NOTE ADULT - SUBJECTIVE AND OBJECTIVE BOX
Called to bedside by RN, left chest wall site with large hematoma, incision CDI. No bleeding from incision site. Pressure dressing applied. Dr. Red called. EP PA to follow up in am. Denies chest pain, sob, palps, fever, chills.

## 2017-04-26 NOTE — CONSULT NOTE ADULT - SUBJECTIVE AND OBJECTIVE BOX
Patient is a 76y old  Female who presents with a chief complaint of n/v, palpitations (25 Apr 2017 21:15)      HPI: hx of CADm, COPD- O2 dependant, CVA, afib, PAD with stent.     Patient admitted with chest palpitation  and generalized weakness after stopping her B blockers last week. B blockers and Calcium channel blocker stopped for pauses seen on Holter as outpatient.  Found to have tachybrady syndrome and had PPM done  4/25.  Plavix was started 4/25.  Noted to have a flutter. Cardiology would like to start Elaquis.         As per cardiology, pt has had a GI bleed in past on ASA.  Pt follows with Dr Rodriguez . She had a EGD less than on year ago ( June 2016) which showed  mucosal edema and friability of GE junction ( esophagitis). No overt ulceration. Pt has been on protonix bid.                      REVIEW OF SYSTEMS:  Constitutional: No fever, weight loss or fatigue  ENMT:  No difficulty hearing, tinnitus, vertigo; No sinus or throat pain  Respiratory: No cough, wheezing, chills or hemoptysis  Cardiovascular: No chest pain, palpitations, dizziness or leg swelling  Gastrointestinal: No abdominal or epigastric pain. No nausea, vomiting or hematemesis; No diarrhea or constipation. No melena or hematochezia.  Skin: No itching, burning, rashes or lesions   Musculoskeletal: No joint pain or swelling; No muscle, back or extremity pain    PAST MEDICAL & SURGICAL HISTORY:  Dyslipidemia  RBBB  Respiratory failure  PVD (peripheral vascular disease)  HTN (hypertension)  Bradycardia  Renal artery anomaly  Vascular disease  CAD (coronary artery disease)  Atrial septal aneurysm  Cardiomyopathy  Hyperlipemia  Hypertension  CVA (cerebral infarction)  COPD (chronic obstructive pulmonary disease)  Atrial fibrillation  Cardiac arrest  Pulmonary edema  S/P appendectomy  S/P D&amp;C (status post dilation and curettage)      FAMILY HISTORY:  No pertinent family history in first degree relatives      SOCIAL HISTORY:  Smoking Status: [ ] Current, [ ] Former, [ ] Never  Pack Years:  [  ] EtOH  [  ] IVDA    MEDICATIONS:  MEDICATIONS  (STANDING):  oxyCODONE ER Tablet 10milliGRAM(s) Oral every 12 hours  amitriptyline 25milliGRAM(s) Oral at bedtime  atorvastatin 40milliGRAM(s) Oral at bedtime  clopidogrel Tablet 75milliGRAM(s) Oral daily  pantoprazole    Tablet 40milliGRAM(s) Oral before breakfast  tiotropium 18 MICROgram(s) Capsule 1Capsule(s) Inhalation daily  cephalexin 500milliGRAM(s) Oral <User Schedule>  potassium chloride   Powder 40milliEquivalent(s) Oral every 4 hours  diltiazem   CD 240milliGRAM(s) Oral daily    MEDICATIONS  (PRN):  oxyCODONE  5 mG/acetaminophen 325 mG 1Tablet(s) Oral every 6 hours PRN Severe Pain (7 - 10)  ALBUTerol    90 MICROgram(s) HFA Inhaler 2Puff(s) Inhalation every 6 hours PRN Shortness of Breath  ALPRAZolam 0.25milliGRAM(s) Oral three times a day PRN Anxiety  acetaminophen   Tablet. 650milliGRAM(s) Oral every 6 hours PRN Mild Pain (1 - 3)  aluminum hydroxide/magnesium hydroxide/simethicone Suspension 30milliLiter(s) Oral every 4 hours PRN Dyspepsia  ondansetron Injectable 4milliGRAM(s) IV Push every 8 hours PRN Nausea and/or Vomiting      Allergies    fiberglass (Rash)  No Known Drug Allergies    Intolerances        Vital Signs Last 24 Hrs  T(C): 36.7, Max: 37.1 (04-26 @ 05:45)  T(F): 98, Max: 98.7 (04-26 @ 05:45)  HR: 113 (91 - 125)  BP: 190/100 (120/60 - 190/100)  BP(mean): --  RR: 19 (16 - 20)  SpO2: 99% (94% - 99%)  I & Os for 24h ending 04-26 @ 07:00  =============================================  IN: 646 ml / OUT: 1300 ml / NET: -654 ml    I & Os for current day (as of 04-26 @ 13:35)  =============================================  IN: 360 ml / OUT: 0 ml / NET: 360 ml        PHYSICAL EXAM:    General: Well developed; well nourished; in no acute distress  HEENT: MMM, conjunctiva and sclera clear  Gastrointestinal: Soft, non-tender non-distended; Normal bowel sounds; No rebound or guarding  Extremities: Normal range of motion, No clubbing, cyanosis or edema  Neurological: Alert and oriented x3  Skin: Warm and dry. No obvious rash      LABS:                        15.7   15.8  )-----------( 336      ( 26 Apr 2017 07:31 )             44.7                 RADIOLOGY & ADDITIONAL STUDIES: Patient is a 76y old  Female who presents with a chief complaint of n/v, palpitations (25 Apr 2017 21:15)      HPI: hx of CADm, COPD- O2 dependant, CVA, afib, PAD with stent.     Patient admitted with chest palpitation  and generalized weakness after stopping her B blockers last week. B blockers and Calcium channel blocker stopped for pauses seen on Holter as outpatient.  Found to have tachybrady syndrome and had PPM done  4/25.  Plavix was started 4/25.  Noted to have a flutter. Cardiology would like to start Elaquis.         As per cardiology, pt has had a GI bleed in past on ASA.  Pt follows with Dr Rodriguez . She had a EGD less than on year ago ( June 2016) which showed  mucosal edema and friability of GE junction ( esophagitis). No overt ulceration. Pt has been on prevacid bid.  She has a long standing hx of acid regurgitation and heartburn X 10 years. Worse after eating. Lasts 2-3 hours. better with PPI. No dysphagia. No melena, no hematachezia                     REVIEW OF SYSTEMS:  Constitutional: No fever, weight loss or fatigue  ENMT:  No difficulty hearing, tinnitus, vertigo; No sinus or throat pain  Respiratory: No cough, wheezing, chills or hemoptysis  Cardiovascular: No chest pain, palpitations, dizziness or leg swelling  Gastrointestinal: + heartburn, no regurgitation . No nausea, vomiting or hematemesis; No diarrhea or constipation. No melena or hematochezia.  Skin: No itching, burning, rashes or lesions   Musculoskeletal: No joint pain or swelling; No muscle, back or extremity pain    PAST MEDICAL & SURGICAL HISTORY:  Dyslipidemia  RBBB  Respiratory failure  PVD (peripheral vascular disease)  HTN (hypertension)  Bradycardia  Renal artery anomaly  Vascular disease  CAD (coronary artery disease)  Atrial septal aneurysm  Cardiomyopathy  Hyperlipemia  Hypertension  CVA (cerebral infarction)  COPD (chronic obstructive pulmonary disease)  Atrial fibrillation  Cardiac arrest  Pulmonary edema  S/P appendectomy  S/P D&amp;C (status post dilation and curettage)      FAMILY HISTORY:  No pertinent family history in first degree relatives      SOCIAL HISTORY:  Smoking Status: [ ] Current, [ ] Former, [ ] Never  Pack Years:  [  ] EtOH  [  ] IVDA    MEDICATIONS:  MEDICATIONS  (STANDING):  oxyCODONE ER Tablet 10milliGRAM(s) Oral every 12 hours  amitriptyline 25milliGRAM(s) Oral at bedtime  atorvastatin 40milliGRAM(s) Oral at bedtime  clopidogrel Tablet 75milliGRAM(s) Oral daily  pantoprazole    Tablet 40milliGRAM(s) Oral before breakfast  tiotropium 18 MICROgram(s) Capsule 1Capsule(s) Inhalation daily  cephalexin 500milliGRAM(s) Oral <User Schedule>  potassium chloride   Powder 40milliEquivalent(s) Oral every 4 hours  diltiazem   CD 240milliGRAM(s) Oral daily    MEDICATIONS  (PRN):  oxyCODONE  5 mG/acetaminophen 325 mG 1Tablet(s) Oral every 6 hours PRN Severe Pain (7 - 10)  ALBUTerol    90 MICROgram(s) HFA Inhaler 2Puff(s) Inhalation every 6 hours PRN Shortness of Breath  ALPRAZolam 0.25milliGRAM(s) Oral three times a day PRN Anxiety  acetaminophen   Tablet. 650milliGRAM(s) Oral every 6 hours PRN Mild Pain (1 - 3)  aluminum hydroxide/magnesium hydroxide/simethicone Suspension 30milliLiter(s) Oral every 4 hours PRN Dyspepsia  ondansetron Injectable 4milliGRAM(s) IV Push every 8 hours PRN Nausea and/or Vomiting      Allergies    fiberglass (Rash)  No Known Drug Allergies    Intolerances        Vital Signs Last 24 Hrs  T(C): 36.7, Max: 37.1 (04-26 @ 05:45)  T(F): 98, Max: 98.7 (04-26 @ 05:45)  HR: 113 (91 - 125)  BP: 190/100 (120/60 - 190/100)  BP(mean): --  RR: 19 (16 - 20)  SpO2: 99% (94% - 99%)  I & Os for 24h ending 04-26 @ 07:00  =============================================  IN: 646 ml / OUT: 1300 ml / NET: -654 ml    I & Os for current day (as of 04-26 @ 13:35)  =============================================  IN: 360 ml / OUT: 0 ml / NET: 360 ml        PHYSICAL EXAM:    General: Well developed; well nourished; in no acute distress  HEENT: MMM, conjunctiva and sclera clear  Gastrointestinal: Soft, non-tender non-distended; Normal bowel sounds; No rebound or guarding  Extremities: Normal range of motion, No clubbing, cyanosis or edema  Neurological: Alert and oriented x3  Skin: Warm and dry. No obvious rash      LABS:                        15.7   15.8  )-----------( 336      ( 26 Apr 2017 07:31 )             44.7                 RADIOLOGY & ADDITIONAL STUDIES:

## 2017-04-27 LAB
ANION GAP SERPL CALC-SCNC: 22 MMOL/L — HIGH (ref 5–17)
B-OH-BUTYR SERPL-SCNC: 2.5 MMOL/L — HIGH
BUN SERPL-MCNC: 14 MG/DL — SIGNIFICANT CHANGE UP (ref 8–20)
CALCIUM SERPL-MCNC: 8.1 MG/DL — LOW (ref 8.6–10.2)
CHLORIDE SERPL-SCNC: 94 MMOL/L — LOW (ref 98–107)
CO2 SERPL-SCNC: 16 MMOL/L — LOW (ref 22–29)
CREAT SERPL-MCNC: 0.62 MG/DL — SIGNIFICANT CHANGE UP (ref 0.5–1.3)
GLUCOSE SERPL-MCNC: 97 MG/DL — SIGNIFICANT CHANGE UP (ref 70–115)
HCT VFR BLD CALC: 44.8 % — SIGNIFICANT CHANGE UP (ref 37–47)
HGB BLD-MCNC: 15.3 G/DL — SIGNIFICANT CHANGE UP (ref 12–16)
MAGNESIUM SERPL-MCNC: 2.1 MG/DL — SIGNIFICANT CHANGE UP (ref 1.8–2.5)
MCHC RBC-ENTMCNC: 29.7 PG — SIGNIFICANT CHANGE UP (ref 27–31)
MCHC RBC-ENTMCNC: 34.2 G/DL — SIGNIFICANT CHANGE UP (ref 32–36)
MCV RBC AUTO: 87 FL — SIGNIFICANT CHANGE UP (ref 81–99)
PLATELET # BLD AUTO: 320 K/UL — SIGNIFICANT CHANGE UP (ref 150–400)
POTASSIUM SERPL-MCNC: 3.4 MMOL/L — LOW (ref 3.5–5.3)
POTASSIUM SERPL-SCNC: 3.4 MMOL/L — LOW (ref 3.5–5.3)
RBC # BLD: 5.15 M/UL — SIGNIFICANT CHANGE UP (ref 4.4–5.2)
RBC # FLD: 13.4 % — SIGNIFICANT CHANGE UP (ref 11–15.6)
SODIUM SERPL-SCNC: 132 MMOL/L — LOW (ref 135–145)
WBC # BLD: 15.6 K/UL — HIGH (ref 4.8–10.8)
WBC # FLD AUTO: 15.6 K/UL — HIGH (ref 4.8–10.8)

## 2017-04-27 PROCEDURE — 71020: CPT | Mod: 26

## 2017-04-27 PROCEDURE — 99233 SBSQ HOSP IP/OBS HIGH 50: CPT

## 2017-04-27 RX ORDER — HYDRALAZINE HCL 50 MG
5 TABLET ORAL ONCE
Qty: 0 | Refills: 0 | Status: COMPLETED | OUTPATIENT
Start: 2017-04-27 | End: 2017-04-27

## 2017-04-27 RX ORDER — POTASSIUM CHLORIDE 20 MEQ
40 PACKET (EA) ORAL ONCE
Qty: 0 | Refills: 0 | Status: COMPLETED | OUTPATIENT
Start: 2017-04-27 | End: 2017-04-27

## 2017-04-27 RX ORDER — POTASSIUM CHLORIDE 20 MEQ
40 PACKET (EA) ORAL ONCE
Qty: 0 | Refills: 0 | Status: DISCONTINUED | OUTPATIENT
Start: 2017-04-27 | End: 2017-04-27

## 2017-04-27 RX ADMIN — Medication 0.25 MILLIGRAM(S): at 08:42

## 2017-04-27 RX ADMIN — PANTOPRAZOLE SODIUM 40 MILLIGRAM(S): 20 TABLET, DELAYED RELEASE ORAL at 06:50

## 2017-04-27 RX ADMIN — Medication 500 MILLIGRAM(S): at 05:45

## 2017-04-27 RX ADMIN — Medication 0.25 MILLIGRAM(S): at 17:16

## 2017-04-27 RX ADMIN — Medication 10 MILLIGRAM(S): at 17:16

## 2017-04-27 RX ADMIN — Medication 5 MILLIGRAM(S): at 21:46

## 2017-04-27 RX ADMIN — AMLODIPINE BESYLATE 5 MILLIGRAM(S): 2.5 TABLET ORAL at 06:51

## 2017-04-27 RX ADMIN — Medication 0.25 MILLIGRAM(S): at 23:21

## 2017-04-27 RX ADMIN — CLOPIDOGREL BISULFATE 75 MILLIGRAM(S): 75 TABLET, FILM COATED ORAL at 11:31

## 2017-04-27 RX ADMIN — Medication 10 MILLIGRAM(S): at 05:45

## 2017-04-27 RX ADMIN — Medication 40 MILLIEQUIVALENT(S): at 11:31

## 2017-04-27 RX ADMIN — Medication 500 MILLIGRAM(S): at 17:16

## 2017-04-27 RX ADMIN — ATORVASTATIN CALCIUM 40 MILLIGRAM(S): 80 TABLET, FILM COATED ORAL at 21:17

## 2017-04-27 NOTE — PROGRESS NOTE ADULT - ASSESSMENT
76 y F hx CAD, COPD, CVA, PAD, chronic back pain, anxiety, HTN, mod-severe MR here with atrial flutter. 76 y F hx CAD, COPD, CVA, PAD, chronic back pain, anxiety, HTN, mod-severe MR here with atrial flutter.    when stable from hematoma point will resume anticoagulation for a fib. plan to remove pressure dressing tomorrow and if  stable d/c home

## 2017-04-27 NOTE — PROGRESS NOTE ADULT - PROBLEM SELECTOR PLAN 1
S/p pacemaker.  Per cardiology, will need EGD prior to starting long term anticoagulation given pt's h/o GIB while on ASA. Eval pending ([place]).  Appreciate cardiology and EP recs S/p pacemaker.  per GI Ok to restart anticoagulation  Appreciate cardiology and EP recs

## 2017-04-27 NOTE — PROGRESS NOTE ADULT - PROBLEM SELECTOR PLAN 4
Worsening HCO3 - 13 today  Increased AG 24  ABG pending  Not hypotensive, not septic  Betahydroxybutyrate pending - ?starvation ketosis given NPo status yesterday
Worsening HCO3 - 13 today  Increased AG 24  ABG pending  Not hypotensive, not septic  Betahydroxybutyrate pending - ?starvation ketosis given NPo status yesterday
Oxygen dependent  C/w tiotropium

## 2017-04-27 NOTE — PROGRESS NOTE ADULT - ASSESSMENT
Assessment  Parox aflutter in SR  Tachybrady s/p DDD pacer  H/O TIA  COPD    Rec    EP to see for Hematoma managment  cont present cardiac therapy, no AC at this time  GI evaluation h/o GIB Will keep on plavix and start eliquis as outpt with protonix BID if GI approves

## 2017-04-27 NOTE — PROGRESS NOTE ADULT - PROBLEM SELECTOR PLAN 3
Smoker  CT chest/abd/pelvis to eval for mass  Glucerna bid Smoker  CT chest/abd/pelvis no mass  Glucerna bid

## 2017-04-27 NOTE — PROGRESS NOTE ADULT - SUBJECTIVE AND OBJECTIVE BOX
[place] [facility] - [facility]                                   [address] [place] [address]                                                      PHONE: [phone]                                                         FAX: [phone]  [email or web address]  ---------------------------------------------------------------------------------------------------------------------------------    Overnight events/patient complaints: s/p DDD apcer with normal function, aflutter converted to SR with St, h/o visual loss ? TIA as per neuro  Hematoma S/P PPM      fiberglass (Rash)  No Known Drug Allergies    MEDICATIONS  (STANDING):  oxyCODONE ER Tablet 10milliGRAM(s) Oral every 12 hours  amitriptyline 25milliGRAM(s) Oral at bedtime  atorvastatin 40milliGRAM(s) Oral at bedtime  clopidogrel Tablet 75milliGRAM(s) Oral daily  pantoprazole    Tablet 40milliGRAM(s) Oral before breakfast  tiotropium 18 MICROgram(s) Capsule 1Capsule(s) Inhalation daily  ceFAZolin   IVPB 2000milliGRAM(s) IV Intermittent every 8 hours  cephalexin 500milliGRAM(s) Oral <User Schedule>  diltiazem   CD 120milliGRAM(s) Oral daily  potassium chloride   Powder 40milliEquivalent(s) Oral every 4 hours    MEDICATIONS  (PRN):  oxyCODONE  5 mG/acetaminophen 325 mG 1Tablet(s) Oral every 6 hours PRN Severe Pain (7 - 10)  ALBUTerol    90 MICROgram(s) HFA Inhaler 2Puff(s) Inhalation every 6 hours PRN Shortness of Breath  ALPRAZolam 0.25milliGRAM(s) Oral three times a day PRN Anxiety  acetaminophen   Tablet. 650milliGRAM(s) Oral every 6 hours PRN Mild Pain (1 - 3)  aluminum hydroxide/magnesium hydroxide/simethicone Suspension 30milliLiter(s) Oral every 4 hours PRN Dyspepsia  ondansetron Injectable 4milliGRAM(s) IV Push every 8 hours PRN Nausea and/or Vomiting      Vital Signs Last 24 Hrs  T(C): 37.1, Max: 37.2 ( @ 11:47)  T(F): 98.7, Max: 98.9 ( @ 11:47)  HR: 110 (91 - 110)  BP: 160/88 (120/56 - 171/72)  BP(mean): --  RR: 18 (16 - 20)  SpO2: 97% (94% - 97%)  Daily     Daily Weight in k.5 (2017 05:00)  ICU Vital Signs Last 24 Hrs  TYLER LANGSTON  I&O's Detail    I & Os for current day (as of 2017 09:12)  =============================================  IN:    Oral Fluid: 360 ml    IV PiggyBack: 150 ml    diltiazem Infusion: 80 ml    heparin  Infusion.: 56 ml    Total IN: 646 ml  ---------------------------------------------  OUT:    Voided: 1300 ml    Total OUT: 1300 ml  ---------------------------------------------  Total NET: -654 ml    I&O's Summary    I & Os for current day (as of 2017 09:12)  =============================================  IN: 646 ml / OUT: 1300 ml / NET: -654 ml    Drug Dosing Weight  TYLER LANGSTON      PHYSICAL EXAM:  General: Appears well developed, well nourished alert and cooperative.  HEENT: Head; normocephalic, atraumatic.  Eyes: Pupils reactive, cornea wnl.  Neck: Supple, no nodes adenopathy, no NVD or carotid bruit or thyromegaly.  CARDIOVASCULAR: Normal S1 and S2, No murmur, rub, gallop or lift.   LUNGS: No rales, rhonchi or wheeze. Normal breath sounds bilaterally.  ABDOMEN: Soft, nontender without mass or organomegaly. bowel sounds normoactive.  EXTREMITIES: No clubbing, cyanosis or edema. Distal pulses wnl.   SKIN: warm and dry with normal turgor.  NEURO: Alert/oriented x 3/normal motor exam. No pathologic reflexes.    PSYCH: normal affect.        LABS:                        15.7   15.8  )-----------( 336      ( 2017 07:31 )             44.7     04-    135  |  98  |  13.0  ----------------------------<  95  3.2<L>   |  13.0<L>  |  0.53    Ca    7.8<L>      2017 07:31  Mg     1.9         TPro  8.6  /  Alb  4.7  /  TBili  0.7  /  DBili  x   /  AST  33<H>  /  ALT  13  /  AlkPhos  69      TYLER LANGSTON  CARDIAC MARKERS ( 2017 00:11 )  x     / 0.04 ng/mL / x     / x     / x      CARDIAC MARKERS ( 2017 14:22 )  x     / 0.05 ng/mL / 256 U/L / x     / 12.7 ng/mL      PT/INR - ( 2017 14:22 )   PT: 11.8 sec;   INR: 1.07 ratio         PTT - ( 2017 07:31 )  PTT:30.8 sec  Urinalysis Basic - ( 2017 20:17 )    Color: Yellow / Appearance: Clear / S.010 / pH: x  Gluc: x / Ketone: Trace  / Bili: Negative / Urobili: Negative mg/dL   Blood: x / Protein: 100 mg/dL / Nitrite: Negative   Leuk Esterase: Negative / RBC: 0-2 /HPF / WBC Negative   Sq Epi: x / Non Sq Epi: Occasional / Bacteria: x        RADIOLOGY & ADDITIONAL STUDIES:Impression:  Cardiomegaly. Clear lungs..                CARLOS CROCKETT M.D., ATTENDING RADIOLOGIST  This document has been electronically signed. 2017  3:53PM    INTERPRETATION OF TELEMETRY (personally reviewed):    ECG:Diagnosis Line Sinus tachycardia with Premature supraventricular complexes  Incomplete right bundle branch block  Possible Right ventricular hypertrophy  ST & T wave abnormality, consider inferior ischemia  Abnormal ECG    Confirmed by GERRY JULIEN ([id number]) on 2017 6:46:24 PM

## 2017-04-27 NOTE — CHART NOTE - NSCHARTNOTEFT_GEN_A_CORE
Upon Nutritional Assessment by the Registered Dietitian your patient was determined to meet criteria / has evidence of the following diagnosis/diagnoses:          [ ]  Mild Protein Calorie Malnutrition        [ ]  Moderate Protein Calorie Malnutrition        [x] Severe Protein Calorie Malnutrition        [ ] Unspecified Protein Calorie Malnutrition        [ ] Underweight / BMI <19        [ ] Morbid Obesity / BMI > 40      Findings as based on:  •  Comprehensive nutrition assessment and consultation  •  Calorie counts (nutrient intake analysis)  •  Food acceptance and intake status from observations by staff  •  Follow up  •  Patient education  •  Intervention secondary to interdisciplinary rounds  •   concerns      Treatment:    The following diet has been recommended:  --continue Glucerna 1 can BID (per pt preference)     PROVIDER Section:     By signing this assessment you are acknowledging and agree with the diagnosis/diagnoses assigned by the Registered Dietitian    Comments:

## 2017-04-27 NOTE — PROGRESS NOTE ADULT - SUBJECTIVE AND OBJECTIVE BOX
HEALTH ISSUES - PROBLEM Dx:  HPI:  76 y F hx CAD, COPD on home O2 qhs, CVA, chronic back pain, afib, PAD s/p stents, presented to ER c/o 2 d hx n/v, palpitations, chest discomfort. Diltiazem and metoprolol stopped 2 d ago by Dr Miranda for sinus pauses noted on recent holter monitoring. Denies F/C, cough, SOB, diarrhea, abd pain, h/a, LE edema. noted to be in aflutter in ED w HR 120s-180s, evaluated by cardiology and started on cardizem drip. Feeling a bit better now. (24 Apr 2017 17:02)    NOW  PPm for SSS,     INTERVAL HPI/ OVERNIGHT EVENTS:  pt c/o pain PPM site  denies chest pain, nausea, vomits, cough, SOB, fever, HA    MEDICATIONS  (STANDING):  oxyCODONE ER Tablet 10milliGRAM(s) Oral every 12 hours  amitriptyline 25milliGRAM(s) Oral at bedtime  atorvastatin 40milliGRAM(s) Oral at bedtime  clopidogrel Tablet 75milliGRAM(s) Oral daily  pantoprazole    Tablet 40milliGRAM(s) Oral before breakfast  cephalexin 500milliGRAM(s) Oral <User Schedule>  diltiazem   CD 240milliGRAM(s) Oral daily  amLODIPine   Tablet 5milliGRAM(s) Oral daily  hydrALAZINE 10milliGRAM(s) Oral two times a day    MEDICATIONS  (PRN):  oxyCODONE  5 mG/acetaminophen 325 mG 1Tablet(s) Oral every 6 hours PRN Severe Pain (7 - 10)  ALBUTerol    90 MICROgram(s) HFA Inhaler 2Puff(s) Inhalation every 6 hours PRN Shortness of Breath  ALPRAZolam 0.25milliGRAM(s) Oral three times a day PRN Anxiety  acetaminophen   Tablet. 650milliGRAM(s) Oral every 6 hours PRN Mild Pain (1 - 3)  aluminum hydroxide/magnesium hydroxide/simethicone Suspension 30milliLiter(s) Oral every 4 hours PRN Dyspepsia  ondansetron Injectable 4milliGRAM(s) IV Push every 8 hours PRN Nausea and/or Vomiting      Allergies    fiberglass (Rash)  No Known Drug Allergies    Intolerances        Vital Signs Last 24 Hrs  T(C): 36.4, Max: 36.6 (04-26 @ 21:50)  T(F): 97.5, Max: 97.8 (04-26 @ 21:50)  HR: 110 (106 - 118)  BP: 120/70 (120/70 - 160/86)  BP(mean): --  RR: 15 (15 - 18)  SpO2: 100% (98% - 100%)    ACCUCHECKS    PHYSICAL EXAM-  GENERAL: severe cachexia  HEAD:  Atraumatic, Normocephalic  EYES: EOMI, PERRLA, conjunctiva and sclera clear  ENMT: Moist mucous membranes,   NECK: Supple, No JVD, Normal thyroid  NERVOUS SYSTEM:  Alert & Oriented X 3, Motor Strength 5/5 B/L upper and lower extremities;  CHEST/LUNG:  No rales, rhonchi, wheezing, or rubs  HEART: Regular rate and rhythm; No murmurs, rubs, or gallops  ABDOMEN: Soft, Nontender, Nondistended; Bowel sounds present  EXTREMITIES:  2+ Peripheral Pulses, No clubbing, cyanosis, or edema  LYMPH: No lymphadenopathy noted  SKIN: No rashes or lesions; left CW with hematoma PPM site and nwo with pressure dressing. no arm edema, radial pulses +    LABS:                        15.3   15.6  )-----------( 320      ( 27 Apr 2017 07:41 )             44.8     04-27    132<L>  |  94<L>  |  14.0  ----------------------------<  97  3.4<L>   |  16.0<L>  |  0.62    Ca    8.1<L>      27 Apr 2017 07:41  Mg     2.1     04-27      PTT - ( 26 Apr 2017 07:31 )  PTT:30.8 sec    RADIOLOGY & ADDITIONAL TESTS:    Assessment and Plan  DVT Prophylaxis    Discussed with: Patient, family, RN, CM, Consultants  Plan of care/ Discharge planning discussed.    Visit Time:

## 2017-04-27 NOTE — PROGRESS NOTE ADULT - PROBLEM SELECTOR PROBLEM 5
COPD (chronic obstructive pulmonary disease)
COPD (chronic obstructive pulmonary disease)
CAD (coronary artery disease)

## 2017-04-27 NOTE — PROGRESS NOTE ADULT - PROBLEM SELECTOR PLAN 2
4/24 CT chest showing enlarged left pulmonary artery  EKG on admission showing incomplete RBBB  Echo pending  CTA chest pending - r/o PE 4/24 CT chest showing enlarged left pulmonary artery  EKG on admission showing incomplete RBBB  echo and cta noted.

## 2017-04-27 NOTE — PROGRESS NOTE ADULT - SUBJECTIVE AND OBJECTIVE BOX
Called to see pt for pacer pocket hematoma.  Pressure dressing removed, small hematoma without wound compromise seen.  Dry dressing applied.  No specific intervention required, expect spontaneous resolution. Not on A/C at present, would hold off for 72h more if feasible.    Carlos Galindo MD

## 2017-04-27 NOTE — PROGRESS NOTE ADULT - SUBJECTIVE AND OBJECTIVE BOX
LACW chest wall hematoma follow up.    Pt sleeping, arousible, c/o mild soreness to ppm site.      MEDICATIONS  (STANDING):  oxyCODONE ER Tablet 10milliGRAM(s) Oral every 12 hours  amitriptyline 25milliGRAM(s) Oral at bedtime  atorvastatin 40milliGRAM(s) Oral at bedtime  clopidogrel Tablet 75milliGRAM(s) Oral daily  pantoprazole    Tablet 40milliGRAM(s) Oral before breakfast  tiotropium 18 MICROgram(s) Capsule 1Capsule(s) Inhalation daily  cephalexin 500milliGRAM(s) Oral <User Schedule>  diltiazem   CD 240milliGRAM(s) Oral daily  amLODIPine   Tablet 5milliGRAM(s) Oral daily  hydrALAZINE 10milliGRAM(s) Oral two times a day    MEDICATIONS  (PRN):  oxyCODONE  5 mG/acetaminophen 325 mG 1Tablet(s) Oral every 6 hours PRN Severe Pain (7 - 10)  ALBUTerol    90 MICROgram(s) HFA Inhaler 2Puff(s) Inhalation every 6 hours PRN Shortness of Breath  ALPRAZolam 0.25milliGRAM(s) Oral three times a day PRN Anxiety  acetaminophen   Tablet. 650milliGRAM(s) Oral every 6 hours PRN Mild Pain (1 - 3)  aluminum hydroxide/magnesium hydroxide/simethicone Suspension 30milliLiter(s) Oral every 4 hours PRN Dyspepsia  ondansetron Injectable 4milliGRAM(s) IV Push every 8 hours PRN Nausea and/or Vomiting      Vital Signs Last 24 Hrs  T(C): 36.6, Max: 36.7 (04-26 @ 11:04)  T(F): 97.8, Max: 98 (04-26 @ 11:04)  HR: 113 (106 - 118)  BP: 158/70 (152/84 - 190/100)  RR: 16 (16 - 19)  SpO2: 100% (98% - 100%)      LABS:                        15.3   15.6  )-----------( 320      ( 27 Apr 2017 07:41 )             44.8     POD #2 s/p dual chamber BSCI ppm secondary to tachybrady syndrome.  Pt given lovenox 40mg SQ yesterday am. SVC called by RN last night for hematoma at ppm site.  Pressure dressing placed. VSS/CBC stable  no evidence of LUE swelling +2 radial pulse  -continue pressure dressing x 24 hours, EP SVC to remove in am  -NO ANTICOAGULATION X 1 week from PACEMAKER IMPLANT PER DR BEDOYA, then ac recommendations as per primary Cardiology team.  -am cbc  -reviewed with pt prn pain medicine available  -reviewed with nursing staff a/c recommendations  -DVT prophy: seq teds while in bed and ambulate while awake    DW Dr bedoya agrees

## 2017-04-28 VITALS — DIASTOLIC BLOOD PRESSURE: 58 MMHG | HEART RATE: 103 BPM | SYSTOLIC BLOOD PRESSURE: 154 MMHG

## 2017-04-28 LAB
HCT VFR BLD CALC: 42.4 % — SIGNIFICANT CHANGE UP (ref 37–47)
HGB BLD-MCNC: 14.8 G/DL — SIGNIFICANT CHANGE UP (ref 12–16)
MCHC RBC-ENTMCNC: 30.2 PG — SIGNIFICANT CHANGE UP (ref 27–31)
MCHC RBC-ENTMCNC: 34.9 G/DL — SIGNIFICANT CHANGE UP (ref 32–36)
MCV RBC AUTO: 86.5 FL — SIGNIFICANT CHANGE UP (ref 81–99)
PLATELET # BLD AUTO: 291 K/UL — SIGNIFICANT CHANGE UP (ref 150–400)
RBC # BLD: 4.9 M/UL — SIGNIFICANT CHANGE UP (ref 4.4–5.2)
RBC # FLD: 13.4 % — SIGNIFICANT CHANGE UP (ref 11–15.6)
WBC # BLD: 12.2 K/UL — HIGH (ref 4.8–10.8)
WBC # FLD AUTO: 12.2 K/UL — HIGH (ref 4.8–10.8)

## 2017-04-28 PROCEDURE — 85027 COMPLETE CBC AUTOMATED: CPT

## 2017-04-28 PROCEDURE — 70450 CT HEAD/BRAIN W/O DYE: CPT

## 2017-04-28 PROCEDURE — 81001 URINALYSIS AUTO W/SCOPE: CPT

## 2017-04-28 PROCEDURE — 71250 CT THORAX DX C-: CPT

## 2017-04-28 PROCEDURE — 85730 THROMBOPLASTIN TIME PARTIAL: CPT

## 2017-04-28 PROCEDURE — 80053 COMPREHEN METABOLIC PANEL: CPT

## 2017-04-28 PROCEDURE — 99285 EMERGENCY DEPT VISIT HI MDM: CPT | Mod: 25

## 2017-04-28 PROCEDURE — 82010 KETONE BODYS QUAN: CPT

## 2017-04-28 PROCEDURE — 82553 CREATINE MB FRACTION: CPT

## 2017-04-28 PROCEDURE — 82803 BLOOD GASES ANY COMBINATION: CPT

## 2017-04-28 PROCEDURE — 85610 PROTHROMBIN TIME: CPT

## 2017-04-28 PROCEDURE — 99239 HOSP IP/OBS DSCHRG MGMT >30: CPT

## 2017-04-28 PROCEDURE — C1894: CPT

## 2017-04-28 PROCEDURE — 36600 WITHDRAWAL OF ARTERIAL BLOOD: CPT

## 2017-04-28 PROCEDURE — 82550 ASSAY OF CK (CPK): CPT

## 2017-04-28 PROCEDURE — C1785: CPT

## 2017-04-28 PROCEDURE — 87040 BLOOD CULTURE FOR BACTERIA: CPT

## 2017-04-28 PROCEDURE — 83735 ASSAY OF MAGNESIUM: CPT

## 2017-04-28 PROCEDURE — 71260 CT THORAX DX C+: CPT

## 2017-04-28 PROCEDURE — 96374 THER/PROPH/DIAG INJ IV PUSH: CPT

## 2017-04-28 PROCEDURE — 93306 TTE W/DOPPLER COMPLETE: CPT

## 2017-04-28 PROCEDURE — 36415 COLL VENOUS BLD VENIPUNCTURE: CPT

## 2017-04-28 PROCEDURE — 84484 ASSAY OF TROPONIN QUANT: CPT

## 2017-04-28 PROCEDURE — 93005 ELECTROCARDIOGRAM TRACING: CPT

## 2017-04-28 PROCEDURE — 74177 CT ABD & PELVIS W/CONTRAST: CPT

## 2017-04-28 PROCEDURE — 71046 X-RAY EXAM CHEST 2 VIEWS: CPT

## 2017-04-28 PROCEDURE — 80048 BASIC METABOLIC PNL TOTAL CA: CPT

## 2017-04-28 PROCEDURE — 33208 INSRT HEART PM ATRIAL & VENT: CPT

## 2017-04-28 PROCEDURE — 83605 ASSAY OF LACTIC ACID: CPT

## 2017-04-28 PROCEDURE — 71045 X-RAY EXAM CHEST 1 VIEW: CPT

## 2017-04-28 RX ORDER — HYDRALAZINE HCL 50 MG
0 TABLET ORAL
Qty: 0 | Refills: 0 | COMMUNITY

## 2017-04-28 RX ORDER — CEPHALEXIN 500 MG
1 CAPSULE ORAL
Qty: 6 | Refills: 0 | OUTPATIENT
Start: 2017-04-28 | End: 2017-05-01

## 2017-04-28 RX ORDER — HYDROCODONE BITARTRATE AND ACETAMINOPHEN 7.5; 325 MG/15ML; MG/15ML
0 SOLUTION ORAL
Qty: 0 | Refills: 0 | COMMUNITY

## 2017-04-28 RX ORDER — AMLODIPINE BESYLATE 2.5 MG/1
1 TABLET ORAL
Qty: 5 | Refills: 0 | OUTPATIENT
Start: 2017-04-28

## 2017-04-28 RX ORDER — DILTIAZEM HCL 120 MG
1 CAPSULE, EXT RELEASE 24 HR ORAL
Qty: 30 | Refills: 0 | OUTPATIENT
Start: 2017-04-28

## 2017-04-28 RX ADMIN — CLOPIDOGREL BISULFATE 75 MILLIGRAM(S): 75 TABLET, FILM COATED ORAL at 12:53

## 2017-04-28 RX ADMIN — PANTOPRAZOLE SODIUM 40 MILLIGRAM(S): 20 TABLET, DELAYED RELEASE ORAL at 06:28

## 2017-04-28 RX ADMIN — Medication 10 MILLIGRAM(S): at 06:28

## 2017-04-28 RX ADMIN — Medication 500 MILLIGRAM(S): at 02:04

## 2017-04-28 RX ADMIN — Medication 500 MILLIGRAM(S): at 14:24

## 2017-04-28 RX ADMIN — OXYCODONE HYDROCHLORIDE 10 MILLIGRAM(S): 5 TABLET ORAL at 08:46

## 2017-04-28 RX ADMIN — Medication 0.25 MILLIGRAM(S): at 06:28

## 2017-04-28 RX ADMIN — AMLODIPINE BESYLATE 5 MILLIGRAM(S): 2.5 TABLET ORAL at 06:28

## 2017-04-28 RX ADMIN — OXYCODONE HYDROCHLORIDE 10 MILLIGRAM(S): 5 TABLET ORAL at 10:16

## 2017-04-28 RX ADMIN — Medication 0.25 MILLIGRAM(S): at 14:30

## 2017-04-28 NOTE — PROGRESS NOTE ADULT - SUBJECTIVE AND OBJECTIVE BOX
[facility]                                   [address] [place] [address]                                                      PHONE: [phone]                                                         FAX: [phone]  [email or web address]  ---------------------------------------------------------------------------------------------------------------------------------    Overnight events/patient complaints:  NAD     fiberglass (Rash)  No Known Drug Allergies    MEDICATIONS  (STANDING):  oxyCODONE ER Tablet 10milliGRAM(s) Oral every 12 hours  amitriptyline 25milliGRAM(s) Oral at bedtime  atorvastatin 40milliGRAM(s) Oral at bedtime  clopidogrel Tablet 75milliGRAM(s) Oral daily  pantoprazole    Tablet 40milliGRAM(s) Oral before breakfast  cephalexin 500milliGRAM(s) Oral <User Schedule>  amLODIPine   Tablet 5milliGRAM(s) Oral daily  diltiazem   CD 300milliGRAM(s) Oral daily    MEDICATIONS  (PRN):  oxyCODONE  5 mG/acetaminophen 325 mG 1Tablet(s) Oral every 6 hours PRN Severe Pain (7 - 10)  ALBUTerol    90 MICROgram(s) HFA Inhaler 2Puff(s) Inhalation every 6 hours PRN Shortness of Breath  ALPRAZolam 0.25milliGRAM(s) Oral three times a day PRN Anxiety  acetaminophen   Tablet. 650milliGRAM(s) Oral every 6 hours PRN Mild Pain (1 - 3)  aluminum hydroxide/magnesium hydroxide/simethicone Suspension 30milliLiter(s) Oral every 4 hours PRN Dyspepsia  ondansetron Injectable 4milliGRAM(s) IV Push every 8 hours PRN Nausea and/or Vomiting      Vital Signs Last 24 Hrs  T(C): 36.6, Max: 36.6 (04-27 @ 17:12)  T(F): 97.9, Max: 97.9 (04-27 @ 17:12)  HR: 113 (110 - 120)  BP: 140/60 (109/64 - 180/70)  BP(mean): --  RR: 16 (15 - 16)  SpO2: 97% (97% - 100%)  ICU Vital Signs Last 24 Hrs  TYLER LANGSTON  I&O's Detail    I & Os for current day (as of 28 Apr 2017 08:46)  =============================================  IN:    Oral Fluid: 780 ml    Total IN: 780 ml  ---------------------------------------------  OUT:    Voided: 700 ml    Total OUT: 700 ml  ---------------------------------------------  Total NET: 80 ml    I&O's Summary    I & Os for current day (as of 28 Apr 2017 08:46)  =============================================  IN: 780 ml / OUT: 700 ml / NET: 80 ml    Drug Dosing Weight  TYLER LANGSTON      PHYSICAL EXAM:  General: Appears well developed, well nourished alert and cooperative.  HEENT: Head; normocephalic, atraumatic.  Eyes: Pupils reactive, cornea wnl.  Neck: Supple, no nodes adenopathy, no NVD or carotid bruit or thyromegaly.  CARDIOVASCULAR: Normal S1 and S2, No murmur, rub, gallop or lift.   LUNGS: No rales, rhonchi or wheeze. Normal breath sounds bilaterally.  ABDOMEN: Soft, nontender without mass or organomegaly. bowel sounds normoactive.  EXTREMITIES: No clubbing, cyanosis or edema. Distal pulses wnl.   SKIN: warm and dry with normal turgor.  NEURO: Alert/oriented x 3/normal motor exam. No pathologic reflexes.    PSYCH: normal affect.        LABS:                        14.8   12.2  )-----------( 291      ( 28 Apr 2017 05:29 )             42.4     04-27    132<L>  |  94<L>  |  14.0  ----------------------------<  97  3.4<L>   |  16.0<L>  |  0.62    Ca    8.1<L>      27 Apr 2017 07:41  Mg     2.1     04-27      TYLER LANGSTNO            RADIOLOGY & ADDITIONAL STUDIES:    INTERPRETATION OF TELEMETRY (personally reviewed):    ECG:    ECHO:   Summary:   1. Left ventricular ejection fraction, by visual estimation, is 60 to   65%.   2. Normal global left ventricular systolic function.   3. Increased LV wall thickness.   4. Normal left ventricular internal cavity size.   5. Spectral Doppler shows impaired relaxation pattern of left   ventricular myocardial filling (Grade I diastolic dysfunction).   6. There is no evidence of pericardial effusion.   7. Mild mitral valve regurgitation.   8. Mild to moderate mitral annular calcification.   9. Moderate aortic regurgitation.  10. Lipomatous hypertrophy of the intra-atrial septum.  11. Mild to moderately enlarged left atrium.      ASSESSMENT AND PLAN:    In summary, TYLER LANGSTON is an 76y Female with past medical history significant for PAF SSS s/p PPM COPD hematoma post PPM H/H stable     Plan  1.  DC planning with follow up next week at [facility]   2.  DC Hydralazine and increase Cardizem to 300mg CD   3.  long term AC when small hematoma resolves but continue Plavix with PPI at this time

## 2017-04-29 LAB
CULTURE RESULTS: SIGNIFICANT CHANGE UP
CULTURE RESULTS: SIGNIFICANT CHANGE UP
SPECIMEN SOURCE: SIGNIFICANT CHANGE UP
SPECIMEN SOURCE: SIGNIFICANT CHANGE UP

## 2017-07-18 ENCOUNTER — MEDICATION RENEWAL (OUTPATIENT)
Age: 77
End: 2017-07-18

## 2017-08-09 ENCOUNTER — APPOINTMENT (OUTPATIENT)
Dept: PULMONOLOGY | Facility: CLINIC | Age: 77
End: 2017-08-09
Payer: MEDICARE

## 2017-08-09 VITALS — OXYGEN SATURATION: 96 % | SYSTOLIC BLOOD PRESSURE: 110 MMHG | HEART RATE: 78 BPM | DIASTOLIC BLOOD PRESSURE: 48 MMHG

## 2017-08-09 VITALS — WEIGHT: 83 LBS | BODY MASS INDEX: 16.21 KG/M2

## 2017-08-09 PROCEDURE — 94010 BREATHING CAPACITY TEST: CPT

## 2017-08-09 PROCEDURE — 99215 OFFICE O/P EST HI 40 MIN: CPT | Mod: 25

## 2017-08-09 RX ORDER — DILTIAZEM HYDROCHLORIDE 240 MG/1
240 CAPSULE, EXTENDED RELEASE ORAL
Refills: 0 | Status: ACTIVE | COMMUNITY

## 2017-08-09 RX ORDER — AMLODIPINE BESYLATE 5 MG/1
5 TABLET ORAL
Refills: 0 | Status: ACTIVE | COMMUNITY

## 2017-08-09 RX ORDER — NYSTATIN AND TRIAMCINOLONE ACETONIDE 100000; 1 MG/G; MG/G
100000-0.1 CREAM TOPICAL TWICE DAILY
Qty: 1 | Refills: 6 | Status: DISCONTINUED | COMMUNITY
Start: 2017-07-07 | End: 2017-08-09

## 2017-08-09 RX ORDER — LANSOPRAZOLE 30 MG/1
30 CAPSULE, DELAYED RELEASE ORAL
Refills: 0 | Status: ACTIVE | COMMUNITY

## 2017-08-09 RX ORDER — OXYCODONE HYDROCHLORIDE 10 MG/1
10 TABLET, FILM COATED, EXTENDED RELEASE ORAL
Refills: 0 | Status: ACTIVE | COMMUNITY

## 2017-08-14 ENCOUNTER — APPOINTMENT (OUTPATIENT)
Dept: OBGYN | Facility: CLINIC | Age: 77
End: 2017-08-14
Payer: MEDICARE

## 2017-09-12 ENCOUNTER — RX RENEWAL (OUTPATIENT)
Age: 77
End: 2017-09-12

## 2017-09-12 ENCOUNTER — APPOINTMENT (OUTPATIENT)
Dept: OBGYN | Facility: CLINIC | Age: 77
End: 2017-09-12
Payer: MEDICARE

## 2017-09-12 VITALS
HEIGHT: 60 IN | BODY MASS INDEX: 17.24 KG/M2 | DIASTOLIC BLOOD PRESSURE: 70 MMHG | WEIGHT: 87.8 LBS | SYSTOLIC BLOOD PRESSURE: 140 MMHG

## 2017-09-12 DIAGNOSIS — Z82.0 FAMILY HISTORY OF EPILEPSY AND OTHER DISEASES OF THE NERVOUS SYSTEM: ICD-10-CM

## 2017-09-12 DIAGNOSIS — Z83.3 FAMILY HISTORY OF DIABETES MELLITUS: ICD-10-CM

## 2017-09-12 DIAGNOSIS — Z82.49 FAMILY HISTORY OF ISCHEMIC HEART DISEASE AND OTHER DISEASES OF THE CIRCULATORY SYSTEM: ICD-10-CM

## 2017-09-12 DIAGNOSIS — Z80.9 FAMILY HISTORY OF MALIGNANT NEOPLASM, UNSPECIFIED: ICD-10-CM

## 2017-09-12 LAB
DATE COLLECTED: NORMAL
HEMOCCULT SP1 STL QL: NEGATIVE
QUALITY CONTROL: YES

## 2017-09-12 PROCEDURE — 82270 OCCULT BLOOD FECES: CPT

## 2017-09-12 PROCEDURE — 99214 OFFICE O/P EST MOD 30 MIN: CPT

## 2017-09-20 LAB — BACTERIA UR CULT: NORMAL

## 2017-09-22 LAB
APPEARANCE: CLEAR
BACTERIA: NEGATIVE
BILIRUBIN URINE: NEGATIVE
BLOOD URINE: ABNORMAL
CALCIUM OXALATE CRYSTALS: ABNORMAL
COLOR: YELLOW
GLUCOSE QUALITATIVE U: NORMAL MG/DL
HYALINE CASTS: 2 /LPF
KETONES URINE: NEGATIVE
LEUKOCYTE ESTERASE URINE: NEGATIVE
MICROSCOPIC-UA: NORMAL
NITRITE URINE: NEGATIVE
PH URINE: 6.5
PROTEIN URINE: 100 MG/DL
RED BLOOD CELLS URINE: 0 /HPF
SPECIFIC GRAVITY URINE: 1.02
SQUAMOUS EPITHELIAL CELLS: 2 /HPF
UROBILINOGEN URINE: NORMAL MG/DL
WHITE BLOOD CELLS URINE: 0 /HPF

## 2018-05-09 ENCOUNTER — APPOINTMENT (OUTPATIENT)
Dept: GASTROENTEROLOGY | Facility: CLINIC | Age: 78
End: 2018-05-09
Payer: MEDICARE

## 2018-05-09 VITALS
WEIGHT: 91 LBS | DIASTOLIC BLOOD PRESSURE: 64 MMHG | BODY MASS INDEX: 17.87 KG/M2 | HEIGHT: 60 IN | OXYGEN SATURATION: 98 % | HEART RATE: 68 BPM | SYSTOLIC BLOOD PRESSURE: 130 MMHG | RESPIRATION RATE: 16 BRPM

## 2018-05-09 DIAGNOSIS — N90.4 LEUKOPLAKIA OF VULVA: ICD-10-CM

## 2018-05-09 DIAGNOSIS — E78.5 HYPERLIPIDEMIA, UNSPECIFIED: ICD-10-CM

## 2018-05-09 DIAGNOSIS — Z87.19 PERSONAL HISTORY OF OTHER DISEASES OF THE DIGESTIVE SYSTEM: ICD-10-CM

## 2018-05-09 DIAGNOSIS — Z95.0 PRESENCE OF CARDIAC PACEMAKER: ICD-10-CM

## 2018-05-09 DIAGNOSIS — K83.8 OTHER SPECIFIED DISEASES OF BILIARY TRACT: ICD-10-CM

## 2018-05-09 PROCEDURE — 99214 OFFICE O/P EST MOD 30 MIN: CPT

## 2018-06-15 LAB
BASOPHILS # BLD AUTO: 0.04 K/UL
BASOPHILS NFR BLD AUTO: 0.5 %
EOSINOPHIL # BLD AUTO: 0.14 K/UL
EOSINOPHIL NFR BLD AUTO: 1.7 %
HCT VFR BLD CALC: 37.4 %
HGB BLD-MCNC: 12.3 G/DL
IMM GRANULOCYTES NFR BLD AUTO: 0.1 %
INR PPP: 0.97 RATIO
LYMPHOCYTES # BLD AUTO: 1.43 K/UL
LYMPHOCYTES NFR BLD AUTO: 17.3 %
MAN DIFF?: NORMAL
MCHC RBC-ENTMCNC: 30.2 PG
MCHC RBC-ENTMCNC: 32.9 GM/DL
MCV RBC AUTO: 91.9 FL
MONOCYTES # BLD AUTO: 0.57 K/UL
MONOCYTES NFR BLD AUTO: 6.9 %
NEUTROPHILS # BLD AUTO: 6.07 K/UL
NEUTROPHILS NFR BLD AUTO: 73.5 %
PLATELET # BLD AUTO: 367 K/UL
PT BLD: 11 SEC
RBC # BLD: 4.07 M/UL
RBC # FLD: 13.1 %
WBC # FLD AUTO: 8.26 K/UL

## 2018-06-18 LAB
ALBUMIN SERPL ELPH-MCNC: 4 G/DL
ALP BLD-CCNC: 73 U/L
ALT SERPL-CCNC: 15 U/L
AMYLASE/CREAT SERPL: 61 U/L
ANION GAP SERPL CALC-SCNC: 13 MMOL/L
AST SERPL-CCNC: 27 U/L
BILIRUB SERPL-MCNC: 0.4 MG/DL
BUN SERPL-MCNC: 11 MG/DL
CALCIUM SERPL-MCNC: 9.5 MG/DL
CHLORIDE SERPL-SCNC: 96 MMOL/L
CO2 SERPL-SCNC: 22 MMOL/L
CREAT SERPL-MCNC: 0.99 MG/DL
FERRITIN SERPL-MCNC: 267 NG/ML
GLUCOSE SERPL-MCNC: 79 MG/DL
IRON SATN MFR SERPL: 35 %
IRON SERPL-MCNC: 69 UG/DL
LPL SERPL-CCNC: 36 U/L
MAGNESIUM SERPL-MCNC: 2 MG/DL
POTASSIUM SERPL-SCNC: 4.4 MMOL/L
SODIUM SERPL-SCNC: 131 MMOL/L
TIBC SERPL-MCNC: 200 UG/DL
UIBC SERPL-MCNC: 131 UG/DL
VIT B12 SERPL-MCNC: 329 PG/ML

## 2018-06-20 ENCOUNTER — APPOINTMENT (OUTPATIENT)
Dept: GASTROENTEROLOGY | Facility: CLINIC | Age: 78
End: 2018-06-20
Payer: MEDICARE

## 2018-06-20 VITALS
HEIGHT: 63 IN | HEART RATE: 64 BPM | BODY MASS INDEX: 16.48 KG/M2 | OXYGEN SATURATION: 96 % | DIASTOLIC BLOOD PRESSURE: 64 MMHG | WEIGHT: 93 LBS | RESPIRATION RATE: 16 BRPM | SYSTOLIC BLOOD PRESSURE: 120 MMHG

## 2018-06-20 PROCEDURE — 99214 OFFICE O/P EST MOD 30 MIN: CPT

## 2018-09-13 ENCOUNTER — APPOINTMENT (OUTPATIENT)
Dept: OBGYN | Facility: CLINIC | Age: 78
End: 2018-09-13
Payer: MEDICARE

## 2018-09-13 VITALS
SYSTOLIC BLOOD PRESSURE: 120 MMHG | BODY MASS INDEX: 16.02 KG/M2 | WEIGHT: 90.4 LBS | DIASTOLIC BLOOD PRESSURE: 68 MMHG | HEIGHT: 63 IN

## 2018-09-13 DIAGNOSIS — Z86.73 PERSONAL HISTORY OF TRANSIENT ISCHEMIC ATTACK (TIA), AND CEREBRAL INFARCTION W/OUT RESIDUAL DEFICITS: ICD-10-CM

## 2018-09-13 DIAGNOSIS — Z95.0 PRESENCE OF CARDIAC PACEMAKER: ICD-10-CM

## 2018-09-13 LAB
DATE COLLECTED: NORMAL
HEMOCCULT SP1 STL QL: NEGATIVE
QUALITY CONTROL: YES

## 2018-09-13 PROCEDURE — 82270 OCCULT BLOOD FECES: CPT

## 2018-09-13 PROCEDURE — 99214 OFFICE O/P EST MOD 30 MIN: CPT

## 2018-09-13 RX ORDER — CLOBETASOL PROPIONATE 0.5 MG/G
0.05 CREAM TOPICAL DAILY
Qty: 60 | Refills: 4 | Status: ACTIVE | COMMUNITY
Start: 2018-09-13 | End: 1900-01-01

## 2018-09-19 LAB — CYTOLOGY CVX/VAG DOC THIN PREP: NORMAL

## 2018-12-21 ENCOUNTER — RX RENEWAL (OUTPATIENT)
Age: 78
End: 2018-12-21

## 2018-12-24 ENCOUNTER — RX RENEWAL (OUTPATIENT)
Age: 78
End: 2018-12-24

## 2018-12-26 ENCOUNTER — RX RENEWAL (OUTPATIENT)
Age: 78
End: 2018-12-26

## 2019-01-14 ENCOUNTER — APPOINTMENT (OUTPATIENT)
Dept: PULMONOLOGY | Facility: CLINIC | Age: 79
End: 2019-01-14
Payer: MEDICARE

## 2019-01-14 VITALS
OXYGEN SATURATION: 97 % | WEIGHT: 90 LBS | DIASTOLIC BLOOD PRESSURE: 60 MMHG | BODY MASS INDEX: 15.94 KG/M2 | SYSTOLIC BLOOD PRESSURE: 120 MMHG | HEART RATE: 70 BPM

## 2019-01-14 DIAGNOSIS — I34.0 NONRHEUMATIC MITRAL (VALVE) INSUFFICIENCY: ICD-10-CM

## 2019-01-14 PROCEDURE — 99214 OFFICE O/P EST MOD 30 MIN: CPT

## 2019-01-14 RX ORDER — CLOBETASOL PROPIONATE 0.5 MG/G
0.05 CREAM TOPICAL TWICE DAILY
Qty: 45 | Refills: 4 | Status: DISCONTINUED | COMMUNITY
Start: 2017-09-12 | End: 2019-01-14

## 2019-01-14 RX ORDER — NYSTATIN 100000 [USP'U]/G
100000 CREAM TOPICAL 3 TIMES DAILY
Qty: 60 | Refills: 5 | Status: DISCONTINUED | COMMUNITY
Start: 2018-09-13 | End: 2019-01-14

## 2019-01-14 RX ORDER — NYSTATIN AND TRIAMCINOLONE ACETONIDE 100000; 1 MG/G; MG/G
100000-0.1 CREAM TOPICAL TWICE DAILY
Qty: 1 | Refills: 6 | Status: DISCONTINUED | COMMUNITY
Start: 2017-09-12 | End: 2019-01-14

## 2019-01-14 NOTE — PHYSICAL EXAM
[General Appearance - Well Developed] : well developed [Normal Appearance] : normal appearance [Well Groomed] : well groomed [General Appearance - Well Nourished] : well nourished [No Deformities] : no deformities [General Appearance - In No Acute Distress] : no acute distress [Normal Conjunctiva] : the conjunctiva exhibited no abnormalities [Eyelids - No Xanthelasma] : the eyelids demonstrated no xanthelasmas [Normal Oropharynx] : normal oropharynx [Neck Appearance] : the appearance of the neck was normal [Neck Cervical Mass (___cm)] : no neck mass was observed [Jugular Venous Distention Increased] : there was no jugular-venous distention [Thyroid Diffuse Enlargement] : the thyroid was not enlarged [Thyroid Nodule] : there were no palpable thyroid nodules [Heart Rate And Rhythm] : heart rate and rhythm were normal [Heart Sounds] : normal S1 and S2 [Murmurs] : no murmurs present [Respiration, Rhythm And Depth] : normal respiratory rhythm and effort [Exaggerated Use Of Accessory Muscles For Inspiration] : no accessory muscle use [Auscultation Breath Sounds / Voice Sounds] : lungs were clear to auscultation bilaterally [Kyphosis] : kyphosis [Abdomen Soft] : soft [Abdomen Tenderness] : non-tender [Abdomen Mass (___ Cm)] : no abdominal mass palpated [Abnormal Walk] : normal gait [Gait - Sufficient For Exercise Testing] : the gait was sufficient for exercise testing [Nail Clubbing] : no clubbing of the fingernails [Cyanosis, Localized] : no localized cyanosis [Petechial Hemorrhages (___cm)] : no petechial hemorrhages [Skin Color & Pigmentation] : normal skin color and pigmentation [] : no rash [No Venous Stasis] : no venous stasis [Skin Lesions] : no skin lesions [No Skin Ulcers] : no skin ulcer [No Xanthoma] : no  xanthoma was observed [Deep Tendon Reflexes (DTR)] : deep tendon reflexes were 2+ and symmetric [Sensation] : the sensory exam was normal to light touch and pinprick [No Focal Deficits] : no focal deficits

## 2019-01-14 NOTE — HISTORY OF PRESENT ILLNESS
[Stable] : stable [Difficulty Breathing During Exertion] : worsened dyspnea on exertion [Feelings Of Weakness On Exertion] : stable exercise intolerance [Coughing Up Sputum] : denies coughing up sputum [Wheezing] : denies wheezing [Regional Soft Tissue Swelling Both Lower Extremities] : denies lower extremity edema [None] : None [Adherent] : the patient is adherent with ~his/her~ medication regimen [de-identified] : mitral regurgitation, paroxysmal atrial flutter [de-identified] : Pacemaker [de-identified] : using Anoro

## 2019-01-14 NOTE — DISCUSSION/SUMMARY
[COPD] : chronic obstructive pulmonary disease [Oxygen-Dependent] : oxygen-dependent [Stable] : stable [Stage IV (Very Severe)] : stage IV (very severe) [None] : There are no changes in medication management [Supplemental Oxygen] : supplemental oxygen [Patient] : the patient [de-identified] : at night [de-identified] : by oxygen  criteria

## 2019-01-14 NOTE — CONSULT LETTER
[Dear  ___] : Dear  [unfilled], [Consult Letter:] : I had the pleasure of evaluating your patient, [unfilled]. [Please see my note below.] : Please see my note below. [Sincerely,] : Sincerely, [DrMadyson  ___] : Dr. KANG [FreeTextEntry3] : Nhan Ramirez MD FCCP\par Pulmonary/Critical Care/Sleep Medicine\par Department of Internal Medicine\par \par [facility] [employer]\par

## 2019-03-15 ENCOUNTER — APPOINTMENT (OUTPATIENT)
Dept: PULMONOLOGY | Facility: CLINIC | Age: 79
End: 2019-03-15
Payer: MEDICARE

## 2019-03-15 VITALS — HEART RATE: 66 BPM | DIASTOLIC BLOOD PRESSURE: 60 MMHG | SYSTOLIC BLOOD PRESSURE: 126 MMHG | OXYGEN SATURATION: 98 %

## 2019-03-15 DIAGNOSIS — I48.91 UNSPECIFIED ATRIAL FIBRILLATION: ICD-10-CM

## 2019-03-15 DIAGNOSIS — G45.3 AMAUROSIS FUGAX: ICD-10-CM

## 2019-03-15 PROCEDURE — 99215 OFFICE O/P EST HI 40 MIN: CPT | Mod: 25

## 2019-03-15 RX ORDER — IODOQUINOL, HYDROCORTISONE ACETATE AND ALOE VERA LEAF 10; 20; 10 MG/G; MG/G; MG/G
1-2-1 GEL TOPICAL 3 TIMES DAILY
Qty: 2 | Refills: 0 | Status: DISCONTINUED | COMMUNITY
Start: 2017-09-12 | End: 2019-03-15

## 2019-03-15 NOTE — DISCUSSION/SUMMARY
[COPD] : chronic obstructive pulmonary disease [Oxygen-Dependent] : oxygen-dependent [Stable] : stable [Stage IV (Very Severe)] : stage IV (very severe) [None] : There are no changes in medication management [Supplemental Oxygen] : supplemental oxygen [Patient] : the patient [FreeTextEntry1] : Recent amaurosis fugax with a fib\par On AC\par Dyspnea due to chronotropic insufficiency [de-identified] : at night [de-identified] : by oxygen  criteria

## 2019-03-15 NOTE — HISTORY OF PRESENT ILLNESS
[Stable] : stable [Feelings Of Weakness On Exertion] : stable exercise intolerance [Coughing Up Sputum] : denies coughing up sputum [Wheezing] : denies wheezing [Regional Soft Tissue Swelling Both Lower Extremities] : denies lower extremity edema [None] : None [Adherent] : the patient is adherent with ~his/her~ medication regimen [Difficulty Breathing During Exertion] : stable dyspnea on exertion [de-identified] : mitral regurgitation, paroxysmal atrial flutter [de-identified] : Pacemaker, 1wk ago at [facility] 1 day admission with visual deficit rt worsening. Has seen opthalmology, arranged appt with cardiology [de-identified] : using Anoro

## 2019-03-15 NOTE — CONSULT LETTER
[Dear  ___] : Dear  [unfilled], [Consult Letter:] : I had the pleasure of evaluating your patient, [unfilled]. [Please see my note below.] : Please see my note below. [Sincerely,] : Sincerely, [DrMadyson  ___] : Dr. KANG [FreeTextEntry3] : Nhan Ramirez MD FCCP\par Pulmonary/Critical Care/Sleep Medicine\par Department of Internal Medicine\par \par [facility] [employer]\par

## 2019-07-16 ENCOUNTER — APPOINTMENT (OUTPATIENT)
Dept: PULMONOLOGY | Facility: CLINIC | Age: 79
End: 2019-07-16
Payer: MEDICARE

## 2019-07-16 VITALS
SYSTOLIC BLOOD PRESSURE: 118 MMHG | OXYGEN SATURATION: 98 % | RESPIRATION RATE: 14 BRPM | HEART RATE: 68 BPM | DIASTOLIC BLOOD PRESSURE: 78 MMHG

## 2019-07-16 VITALS — BODY MASS INDEX: 18.89 KG/M2 | WEIGHT: 90 LBS | HEIGHT: 58 IN

## 2019-07-16 DIAGNOSIS — J98.11 ATELECTASIS: ICD-10-CM

## 2019-07-16 PROCEDURE — 94727 GAS DIL/WSHOT DETER LNG VOL: CPT

## 2019-07-16 PROCEDURE — 99214 OFFICE O/P EST MOD 30 MIN: CPT | Mod: 25

## 2019-07-16 PROCEDURE — 94729 DIFFUSING CAPACITY: CPT

## 2019-07-16 PROCEDURE — 94060 EVALUATION OF WHEEZING: CPT

## 2019-07-16 PROCEDURE — 94664 DEMO&/EVAL PT USE INHALER: CPT | Mod: 59

## 2019-07-16 PROCEDURE — 85018 HEMOGLOBIN: CPT | Mod: QW

## 2019-07-16 NOTE — CONSULT LETTER
[Dear  ___] : Dear  [unfilled], [Consult Letter:] : I had the pleasure of evaluating your patient, [unfilled]. [Please see my note below.] : Please see my note below. [Sincerely,] : Sincerely, [DrMadyson  ___] : Dr. KANG [FreeTextEntry3] : Nhan Ramirez MD FCCP\par Pulmonary/Critical Care/Sleep Medicine\par Department of Internal Medicine\par \par [facility] [employer] [facility]\par

## 2019-07-16 NOTE — PROCEDURE
[___%] : last visit [unfilled]U% [Spirometry] : stable [FreeTextEntry1] : CTA of Neck demonstrates Emphysematous changes of the upper lobes by reportwhich was per at [facility].

## 2019-07-16 NOTE — HISTORY OF PRESENT ILLNESS
[Stable] : stable [Difficulty Breathing During Exertion] : stable dyspnea on exertion [Feelings Of Weakness On Exertion] : stable exercise intolerance [Coughing Up Sputum] : denies coughing up sputum [Wheezing] : denies wheezing [Regional Soft Tissue Swelling Both Lower Extremities] : denies lower extremity edema [None] : None [Adherent] : the patient is adherent with ~his/her~ medication regimen [Tobacco Use] : ~He/She~ uses tobacco [de-identified] : mitral regurgitation, paroxysmal atrial flutter, ppm [de-identified] : See below [FreeTextEntry8] : Dc'ed 2012 >50pk yr [FreeTextEntry1] : On the day of her last visit. She on my recommendation, the patient was referred back to [facility] for evaluation of visual changes. There she underwent an extensive evaluation which was felt to be on the basis of amaurosis fugax, most likely from her internal carotid artery. Concomitantly, she found to have a lung lesion and underwent a CAT scan of the chest, which is not available for my review or sent to me by the institution despite the patient's request. A left lung lesion was reportedly found and the patient sought evaluation at [facility] in [place]. She reportedly underwent a PET scan and is currently scheduled for a biopsy at [facility] in [place].

## 2019-07-16 NOTE — DISCUSSION/SUMMARY
[COPD] : chronic obstructive pulmonary disease [Oxygen-Dependent] : oxygen-dependent [Stable] : stable [Stage IV (Very Severe)] : stage IV (very severe) [None] : There are no changes in medication management [Supplemental Oxygen] : supplemental oxygen [Patient] : the patient [FreeTextEntry1] : Patient's history strongly suggests that she most likely has a malignant lung lesion, which is presently being worked up at [facility]. Patient has been asked to obtain records regarding her scanning, pathology, etc., for this office [de-identified] : at night [de-identified] : by oxygen  criteria

## 2019-07-31 ENCOUNTER — RX RENEWAL (OUTPATIENT)
Age: 79
End: 2019-07-31

## 2019-07-31 RX ORDER — LANSOPRAZOLE 30 MG/1
30 CAPSULE, DELAYED RELEASE ORAL TWICE DAILY
Qty: 180 | Refills: 3 | Status: ACTIVE | COMMUNITY
Start: 2018-06-20 | End: 1900-01-01

## 2019-09-18 ENCOUNTER — APPOINTMENT (OUTPATIENT)
Dept: OBGYN | Facility: CLINIC | Age: 79
End: 2019-09-18

## 2019-10-11 ENCOUNTER — APPOINTMENT (OUTPATIENT)
Dept: GASTROENTEROLOGY | Facility: CLINIC | Age: 79
End: 2019-10-11
Payer: MEDICARE

## 2019-10-11 VITALS
HEART RATE: 60 BPM | WEIGHT: 88 LBS | BODY MASS INDEX: 18.47 KG/M2 | HEIGHT: 58 IN | DIASTOLIC BLOOD PRESSURE: 51 MMHG | SYSTOLIC BLOOD PRESSURE: 118 MMHG

## 2019-10-11 DIAGNOSIS — R10.13 EPIGASTRIC PAIN: ICD-10-CM

## 2019-10-11 DIAGNOSIS — K21.0 GASTRO-ESOPHAGEAL REFLUX DISEASE WITH ESOPHAGITIS: ICD-10-CM

## 2019-10-11 DIAGNOSIS — G89.29 EPIGASTRIC PAIN: ICD-10-CM

## 2019-10-11 PROCEDURE — 99214 OFFICE O/P EST MOD 30 MIN: CPT

## 2019-10-11 NOTE — PHYSICAL EXAM
[General Appearance - In No Acute Distress] : in no acute distress [General Appearance - Alert] : alert [General Appearance - Well Nourished] : well nourished [General Appearance - Well-Appearing] : healthy appearing [General Appearance - Well Developed] : well developed [PERRL With Normal Accommodation] : pupils were equal in size, round, and reactive to light [Sclera] : the sclera and conjunctiva were normal [Outer Ear] : the ears and nose were normal in appearance [Extraocular Movements] : extraocular movements were intact [Hearing Threshold Finger Rub Not [place]] : hearing was normal [Oropharynx] : the oropharynx was normal [Heart Rate And Rhythm] : heart rate was normal and rhythm regular [Neck Appearance] : the appearance of the neck was normal [Bowel Sounds] : normal bowel sounds [Abdomen Soft] : soft [Abdomen Tenderness] : non-tender [Abdomen Mass (___ Cm)] : no abdominal mass palpated [Patient Refused] : rectal exam was refused by the patient [Abnormal Walk] : normal gait [Musculoskeletal - Swelling] : no joint swelling seen [Involuntary Movements] : no involuntary movements were seen [Nail Clubbing] : no clubbing  or cyanosis of the fingernails [Motor Tone] : muscle strength and tone were normal [Skin Color & Pigmentation] : normal skin color and pigmentation [] : no rash [Skin Lesions] : no skin lesions [Skin Turgor] : normal skin turgor [Motor Exam] : the motor exam was normal [Impaired Insight] : insight and judgment were intact [Oriented To Time, Place, And Person] : oriented to person, place, and time [No Focal Deficits] : no focal deficits [Affect] : the affect was normal [Auscultation Breath Sounds / Voice Sounds] : lungs were clear to auscultation bilaterally [Systolic grade ___/6] : A grade [unfilled]/6 systolic murmur was heard. [FreeTextEntry1] : There is some muscle wasting throughout

## 2019-10-11 NOTE — ASSESSMENT
[FreeTextEntry1] : At this time it appears that she was probably hospitalized due to an exacerbation of her underlying chronic iron deficiency anemia which may have been exacerbated by the use of Eliquis. I recommended that she continue the same medical regimen but that she increase the iron to twice daily. She'll be scheduled for colonoscopy and further recommendations will depend upon the results. The risks, benefits, complications and possible adverse consequences associated with colonoscopy were discussed with the patient.\par

## 2019-10-11 NOTE — HISTORY OF PRESENT ILLNESS
[de-identified] : She has history of gastroesophageal reflux with mild esophagitis for which she takes lansoprazole 30 mg p.o. b.i.d. She last underwent an upper endoscopy in June of 2016 that revealed submucosal edema and friability at the GE junction as well as the presence of some scattered small fundic gland polyps. Biopsies simply revealed acute inflammatory change or mass of reflux at the gastroesophageal junction and no H. pylori organisms. She last underwent colonoscopy in 2013 which was unremarkable but the prep was somewhat suboptimal. Since her last visit with me in June of last year she was diagnosed as possibly having amaurosis fugax for which he was placed on Eliquis in March of this year. She has also been diagnosed with a left lung nodule which was apparently biopsied and was malignant. In September she was treated with radiation. [place] [facility] [place] her hemoglobin had dropped to 5. She underwent an upper endoscopy by Dr. Barrow which apparently identified a small ulcer in the distal esophagus as well as in the stomach but the results are unavailable for my review and according to the patient he did not think that these findings would account for the anemia. At that time she continue to take her usual medications without change. She was transfused 2 units of packed red blood cells. She was scheduled for colonoscopy but the exam was aborted as she did not take the prep because she fell asleep. According to the patient's stools for occult blood were positive. Since discharge she has not been taking any Eliquis. She is now taking pantoprazole 40 mg p.o. b.i.d., Colace twice daily and one iron tablet daily. She denies any abdominal pain, nausea, vomiting, diarrhea or constipation. There is no heartburn. There is no upper abdominal pain. She is here noted to be rescheduled for colonoscopy.

## 2019-11-04 LAB
ANION GAP SERPL CALC-SCNC: 10 MMOL/L
BASOPHILS # BLD AUTO: 0.05 K/UL
BASOPHILS NFR BLD AUTO: 1 %
BUN SERPL-MCNC: 11 MG/DL
CALCIUM SERPL-MCNC: 9.6 MG/DL
CHLORIDE SERPL-SCNC: 103 MMOL/L
CO2 SERPL-SCNC: 26 MMOL/L
CREAT SERPL-MCNC: 1.09 MG/DL
EOSINOPHIL # BLD AUTO: 0.25 K/UL
EOSINOPHIL NFR BLD AUTO: 4.9 %
FERRITIN SERPL-MCNC: 122 NG/ML
GLUCOSE SERPL-MCNC: 84 MG/DL
HCT VFR BLD CALC: 41.9 %
HGB BLD-MCNC: 12.8 G/DL
IMM GRANULOCYTES NFR BLD AUTO: 0.4 %
INR PPP: 0.97 RATIO
IRON SATN MFR SERPL: 17 %
IRON SERPL-MCNC: 45 UG/DL
LYMPHOCYTES # BLD AUTO: 0.99 K/UL
LYMPHOCYTES NFR BLD AUTO: 19.3 %
MAN DIFF?: NORMAL
MCHC RBC-ENTMCNC: 29.1 PG
MCHC RBC-ENTMCNC: 30.5 GM/DL
MCV RBC AUTO: 95.2 FL
MONOCYTES # BLD AUTO: 0.39 K/UL
MONOCYTES NFR BLD AUTO: 7.6 %
NEUTROPHILS # BLD AUTO: 3.42 K/UL
NEUTROPHILS NFR BLD AUTO: 66.8 %
PLATELET # BLD AUTO: 305 K/UL
POTASSIUM SERPL-SCNC: 4.2 MMOL/L
PT BLD: 11 SEC
RBC # BLD: 4.4 M/UL
RBC # FLD: 13.3 %
SODIUM SERPL-SCNC: 139 MMOL/L
TIBC SERPL-MCNC: 260 UG/DL
UIBC SERPL-MCNC: 215 UG/DL
WBC # FLD AUTO: 5.12 K/UL

## 2019-11-06 ENCOUNTER — APPOINTMENT (OUTPATIENT)
Dept: GASTROENTEROLOGY | Facility: GI CENTER | Age: 79
End: 2019-11-06
Payer: MEDICARE

## 2019-11-06 ENCOUNTER — RESULT REVIEW (OUTPATIENT)
Age: 79
End: 2019-11-06

## 2019-11-06 ENCOUNTER — OUTPATIENT (OUTPATIENT)
Dept: OUTPATIENT SERVICES | Facility: HOSPITAL | Age: 79
LOS: 1 days | Discharge: HOME | End: 2019-11-06
Payer: MEDICARE

## 2019-11-06 DIAGNOSIS — R19.5 OTHER FECAL ABNORMALITIES: ICD-10-CM

## 2019-11-06 DIAGNOSIS — K59.09 OTHER CONSTIPATION: ICD-10-CM

## 2019-11-06 DIAGNOSIS — D50.9 IRON DEFICIENCY ANEMIA, UNSPECIFIED: ICD-10-CM

## 2019-11-06 PROCEDURE — 88305 TISSUE EXAM BY PATHOLOGIST: CPT | Mod: 26

## 2019-11-06 PROCEDURE — 88305 TISSUE EXAM BY PATHOLOGIST: CPT

## 2019-11-06 PROCEDURE — 45385 COLONOSCOPY W/LESION REMOVAL: CPT

## 2019-11-06 NOTE — PROCEDURE
[Hemoccult +] : hemoccult positive stool [Iron Deficiency Anemia] : iron deficiency anemia [Procedure Explained] : The procedure was explained [Allergies Reviewed] : allergies reviewed. [Risks] : Risks [Benefits] : benefits [Alternatives] : alternatives [Bleeding] : bleeding risk [Infection] : risk of infection [Consent Obtained] : written consent was obtained prior to the procedure and is detailed in the patient's record [Patient] : the patient [Bowel Prep Kit] : the patient took the appropriate bowel preparation kit as directed [Approved Diet Followed] : the patient avoided solid foods and adhered to the approved diet list for 24 hours prior to the procedure [Automated Blood Pressure Cuff] : automated blood pressure cuff [Cardiac Monitor] : cardiac monitor [Pulse Oximeter] : pulse oximeter [With Snare Polypectomy] : snare polypectomy [Propofol ___ mg IV] : Propofol [unfilled] ~Umg intravenously [2] : 2 [Prep Qualtiy: ___] : Prep Quality:  [unfilled] [Withdrawal Time: ___] : Withdrawal Time:  [unfilled] [Left Lateral Decubitus] : The patient was positioned in the left lateral decubitus position [Terminal Ileum via Ileocecal Valve] : and the terminal ileum was examined by entering the ileocecal valve [Moderate Difficulty] : with moderate difficulty [Insufflated] : insufflated [Single Pass Needed] : after a single pass [Hot Snare Polypectomy] : hot snare polypectomy [Diverticulosis] : diverticulosis [Polyps] : polyps [Biopsy] : biopsy [Normal] : Normal [Sent to Pathology] : was sent to pathology for analysis [Tolerated Well] : the patient tolerated the procedure well [Vital Signs Stable] : the vital signs were stable [No Complications] : There were no complications [Patient Rotated Into Alternating Positions] : the patient was not rotated [de-identified] : The terminal ileum was normal for a distance of about 10cm [de-identified] : 1cm sessile polyp at 35cm removed with snare polypectomy and another 5mm polyp at 40cm removed with the malissa forceps [de-identified] : 3-4mm polyp removed with the malissa forceps [de-identified] : no f/u colonoscopy due to her age

## 2020-01-15 ENCOUNTER — APPOINTMENT (OUTPATIENT)
Dept: PULMONOLOGY | Facility: CLINIC | Age: 80
End: 2020-01-15
Payer: MEDICARE

## 2020-01-15 VITALS
OXYGEN SATURATION: 94 % | HEART RATE: 61 BPM | DIASTOLIC BLOOD PRESSURE: 62 MMHG | HEIGHT: 57.9 IN | BODY MASS INDEX: 18.05 KG/M2 | WEIGHT: 86 LBS | SYSTOLIC BLOOD PRESSURE: 110 MMHG

## 2020-01-15 PROCEDURE — 94010 BREATHING CAPACITY TEST: CPT

## 2020-01-15 PROCEDURE — 99214 OFFICE O/P EST MOD 30 MIN: CPT | Mod: 25

## 2020-01-15 RX ORDER — APIXABAN 5 MG/1
5 TABLET, FILM COATED ORAL
Qty: 30 | Refills: 0 | Status: DISCONTINUED | COMMUNITY
Start: 2019-03-15 | End: 2020-01-15

## 2020-01-15 RX ORDER — POLYETHYLENE GLYCOL 3350, SODIUM SULFATE, SODIUM CHLORIDE, POTASSIUM CHLORIDE, ASCORBIC ACID, SODIUM ASCORBATE 7.5-2.691G
100 KIT ORAL
Qty: 1 | Refills: 0 | Status: DISCONTINUED | COMMUNITY
Start: 2019-10-11 | End: 2020-01-15

## 2020-01-15 NOTE — PROCEDURE
[Spirometry] : stable [___%] : last visit [unfilled]U% [FreeTextEntry1] : CTA of Neck demonstrates Emphysematous changes of the upper lobes by reportwhich was per at [place].

## 2020-01-15 NOTE — HISTORY OF PRESENT ILLNESS
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 The last correspondence received from [facility] radiation on 10/30/19. Reported decrease in lesion size on CT of the chest, abdomen, and pelvis. Patient is pending A PET CT

## 2020-01-15 NOTE — DISCUSSION/SUMMARY
[COPD] : chronic obstructive pulmonary disease [Oxygen-Dependent] : oxygen-dependent [Stable] : stable [None] : There are no changes in medication management [Stage IV (Very Severe)] : stage IV (very severe) [Supplemental Oxygen] : supplemental oxygen [Patient] : the patient [Responding to Treatment] : responding to treatment [Pulmonary Rehabilitation] : pulmonary rehabilitation [de-identified] : at night [de-identified] : by oxygen  criteria [de-identified] : Severe deconditioning [de-identified] : Pt refusing rehab due to anxiety [FreeTextEntry1] : Metastatic squamous cell carcinoma, status post radiation therapy. Further recommendations as per [facility]

## 2020-01-15 NOTE — CONSULT LETTER
[Dear  ___] : Dear  [unfilled], [Consult Letter:] : I had the pleasure of evaluating your patient, [unfilled]. [Sincerely,] : Sincerely, [Please see my note below.] : Please see my note below. [DrMadyson  ___] : Dr. KANG [FreeTextEntry3] : Nhan Ramirez MD FCCP\par Pulmonary/Critical Care/Sleep Medicine\par Department of Internal Medicine\par \par [facility] [employer]\par  [DrMadyson ___] : Dr. KANG

## 2020-07-15 ENCOUNTER — APPOINTMENT (OUTPATIENT)
Dept: PULMONOLOGY | Facility: CLINIC | Age: 80
End: 2020-07-15
Payer: MEDICARE

## 2020-07-15 VITALS
DIASTOLIC BLOOD PRESSURE: 80 MMHG | OXYGEN SATURATION: 97 % | WEIGHT: 55 LBS | SYSTOLIC BLOOD PRESSURE: 116 MMHG | BODY MASS INDEX: 11.87 KG/M2 | HEART RATE: 62 BPM | RESPIRATION RATE: 14 BRPM | HEIGHT: 57 IN

## 2020-07-15 VITALS — WEIGHT: 88 LBS | BODY MASS INDEX: 19.04 KG/M2

## 2020-07-15 DIAGNOSIS — J44.9 CHRONIC OBSTRUCTIVE PULMONARY DISEASE, UNSPECIFIED: ICD-10-CM

## 2020-07-15 DIAGNOSIS — R91.8 OTHER NONSPECIFIC ABNORMAL FINDING OF LUNG FIELD: ICD-10-CM

## 2020-07-15 DIAGNOSIS — C34.90 MALIGNANT NEOPLASM OF UNSPECIFIED PART OF UNSPECIFIED BRONCHUS OR LUNG: ICD-10-CM

## 2020-07-15 DIAGNOSIS — N76.3 SUBACUTE AND CHRONIC VULVITIS: ICD-10-CM

## 2020-07-15 PROCEDURE — 99214 OFFICE O/P EST MOD 30 MIN: CPT

## 2020-07-15 NOTE — PROCEDURE
[FreeTextEntry1] : Pulmonary Function Tests could  not be performed due to covid 19 precautions\par \par

## 2020-07-15 NOTE — DISCUSSION/SUMMARY
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 Patient now has a new lesion on the right which I have not been able to review. She is under the care of [facility]. I find no pulmonary contraindication except for location for needle biopsy.

## 2020-07-15 NOTE — PHYSICAL EXAM
[Normal Oropharynx] : normal oropharynx [No Acute Distress] : no acute distress [No Neck Mass] : no neck mass [Normal Appearance] : normal appearance [Normal S1, S2] : normal s1, s2 [Normal Rate/Rhythm] : normal rate/rhythm [No Murmurs] : no murmurs [No Resp Distress] : no resp distress [No Abnormalities] : no abnormalities [Clear to Auscultation Bilaterally] : clear to auscultation bilaterally [Benign] : benign [Normal Gait] : normal gait [No Edema] : no edema [No Cyanosis] : no cyanosis [No Clubbing] : no clubbing [FROM] : FROM [Normal Color/ Pigmentation] : normal color/ pigmentation [No Focal Deficits] : no focal deficits [Normal Affect] : normal affect [Oriented x3] : oriented x3

## 2020-07-15 NOTE — CONSULT LETTER
[Dear  ___] : Dear  [unfilled], [Sincerely,] : Sincerely, [Please see my note below.] : Please see my note below. [Consult Letter:] : I had the pleasure of evaluating your patient, [unfilled]. [DrMadyson  ___] : Dr. KANG [DrMadyson ___] : Dr. KANG [FreeTextEntry3] : Nhan Ramirez MD FCCP\par Pulmonary/Critical Care/Sleep Medicine\par Department of Internal Medicine\par \par [facility] [employer]\par

## 2020-07-15 NOTE — HISTORY OF PRESENT ILLNESS
[Feelings Of Weakness On Exertion] : stable exercise intolerance [Difficulty Breathing During Exertion] : stable dyspnea on exertion [Stable] : stable [Coughing Up Sputum] : denies coughing up sputum [Wheezing] : denies wheezing [Regional Soft Tissue Swelling Both Lower Extremities] : denies lower extremity edema [Tobacco Use] : ~He/She~ uses tobacco [None] : None [Adherent] : the patient is adherent with ~his/her~ medication regimen [FreeTextEntry1] : Patient is status post radiation therapy to a left lower lobe squamous cell carcinoma at 2 separate locations. A new lesion was found on rt and being followed by [facility].  [de-identified] : See below [de-identified] : mitral regurgitation, paroxysmal atrial flutter, ppm, stage IV squamous cell carcinoma of the lung [FreeTextEntry8] : Dc'ed 2012 >50pk yr

## 2021-02-08 NOTE — ED ADULT TRIAGE NOTE - ARRIVAL FROM
Home Consent was obtained from the patient. The risks and benefits to therapy were discussed in detail. Specifically, the risks of infection, scarring, bleeding, prolonged wound healing, incomplete removal, allergy to anesthesia, nerve injury and recurrence were addressed. Prior to the procedure, the treatment site was clearly identified and confirmed by the patient. All components of Universal Protocol/PAUSE Rule completed.

## 2021-12-21 NOTE — ED ADULT NURSE NOTE - CHIEF COMPLAINT QUOTE
was told to come to ED by [facility]. was due for 24 hour monitor follow up but came to ED
yes

## 2022-06-07 NOTE — PATIENT PROFILE ADULT. - NS TRANSFER EYEGLASSES PAIRS
Lissa Romo MD  6/7/2022 1 pair